# Patient Record
Sex: FEMALE | Race: WHITE | NOT HISPANIC OR LATINO
[De-identification: names, ages, dates, MRNs, and addresses within clinical notes are randomized per-mention and may not be internally consistent; named-entity substitution may affect disease eponyms.]

---

## 2017-02-08 ENCOUNTER — APPOINTMENT (OUTPATIENT)
Dept: ULTRASOUND IMAGING | Facility: HOSPITAL | Age: 82
End: 2017-02-08

## 2017-02-08 ENCOUNTER — OUTPATIENT (OUTPATIENT)
Dept: OUTPATIENT SERVICES | Facility: HOSPITAL | Age: 82
LOS: 1 days | End: 2017-02-08
Payer: MEDICARE

## 2017-02-08 DIAGNOSIS — M79.605 PAIN IN LEFT LEG: ICD-10-CM

## 2017-02-08 PROCEDURE — 93971 EXTREMITY STUDY: CPT | Mod: 26,LT

## 2018-04-04 ENCOUNTER — INPATIENT (INPATIENT)
Facility: HOSPITAL | Age: 83
LOS: 5 days | Discharge: NURSING, REHAB OR LONG-TERM CARE | DRG: 392 | End: 2018-04-10
Attending: INTERNAL MEDICINE | Admitting: INTERNAL MEDICINE
Payer: MEDICARE

## 2018-04-04 VITALS
RESPIRATION RATE: 20 BRPM | OXYGEN SATURATION: 95 % | WEIGHT: 123.02 LBS | HEIGHT: 59 IN | HEART RATE: 74 BPM | SYSTOLIC BLOOD PRESSURE: 158 MMHG | DIASTOLIC BLOOD PRESSURE: 85 MMHG | TEMPERATURE: 97 F

## 2018-04-04 DIAGNOSIS — K52.9 NONINFECTIVE GASTROENTERITIS AND COLITIS, UNSPECIFIED: ICD-10-CM

## 2018-04-04 LAB
ALBUMIN SERPL ELPH-MCNC: 3.9 G/DL — SIGNIFICANT CHANGE UP (ref 3.5–5)
ALP SERPL-CCNC: 132 U/L — HIGH (ref 40–120)
ALT FLD-CCNC: 22 U/L DA — SIGNIFICANT CHANGE UP (ref 10–60)
ANION GAP SERPL CALC-SCNC: 8 MMOL/L — SIGNIFICANT CHANGE UP (ref 5–17)
APTT BLD: 39.3 SEC — HIGH (ref 27.5–37.4)
AST SERPL-CCNC: 32 U/L — SIGNIFICANT CHANGE UP (ref 10–40)
BASOPHILS # BLD AUTO: 0.1 K/UL — SIGNIFICANT CHANGE UP (ref 0–0.2)
BASOPHILS NFR BLD AUTO: 0.8 % — SIGNIFICANT CHANGE UP (ref 0–2)
BILIRUB SERPL-MCNC: 0.7 MG/DL — SIGNIFICANT CHANGE UP (ref 0.2–1.2)
BUN SERPL-MCNC: 56 MG/DL — HIGH (ref 7–18)
CALCIUM SERPL-MCNC: 10.2 MG/DL — SIGNIFICANT CHANGE UP (ref 8.4–10.5)
CHLORIDE SERPL-SCNC: 98 MMOL/L — SIGNIFICANT CHANGE UP (ref 96–108)
CK MB CFR SERPL CALC: 3.4 NG/ML — SIGNIFICANT CHANGE UP (ref 0–3.6)
CO2 SERPL-SCNC: 26 MMOL/L — SIGNIFICANT CHANGE UP (ref 22–31)
CREAT SERPL-MCNC: 1.9 MG/DL — HIGH (ref 0.5–1.3)
EOSINOPHIL # BLD AUTO: 0.2 K/UL — SIGNIFICANT CHANGE UP (ref 0–0.5)
EOSINOPHIL NFR BLD AUTO: 1.6 % — SIGNIFICANT CHANGE UP (ref 0–6)
GLUCOSE SERPL-MCNC: 120 MG/DL — HIGH (ref 70–99)
HCT VFR BLD CALC: 46.8 % — HIGH (ref 34.5–45)
HGB BLD-MCNC: 14.9 G/DL — SIGNIFICANT CHANGE UP (ref 11.5–15.5)
INR BLD: 0.97 RATIO — SIGNIFICANT CHANGE UP (ref 0.88–1.16)
LIDOCAIN IGE QN: 269 U/L — SIGNIFICANT CHANGE UP (ref 73–393)
LYMPHOCYTES # BLD AUTO: 18.7 % — SIGNIFICANT CHANGE UP (ref 13–44)
LYMPHOCYTES # BLD AUTO: 2.1 K/UL — SIGNIFICANT CHANGE UP (ref 1–3.3)
MCHC RBC-ENTMCNC: 29.5 PG — SIGNIFICANT CHANGE UP (ref 27–34)
MCHC RBC-ENTMCNC: 31.9 GM/DL — LOW (ref 32–36)
MCV RBC AUTO: 92.6 FL — SIGNIFICANT CHANGE UP (ref 80–100)
MONOCYTES # BLD AUTO: 0.8 K/UL — SIGNIFICANT CHANGE UP (ref 0–0.9)
MONOCYTES NFR BLD AUTO: 7.1 % — SIGNIFICANT CHANGE UP (ref 2–14)
NEUTROPHILS # BLD AUTO: 7.9 K/UL — HIGH (ref 1.8–7.4)
NEUTROPHILS NFR BLD AUTO: 71.8 % — SIGNIFICANT CHANGE UP (ref 43–77)
OB PNL STL: POSITIVE
PLATELET # BLD AUTO: 223 K/UL — SIGNIFICANT CHANGE UP (ref 150–400)
POTASSIUM SERPL-MCNC: 4.6 MMOL/L — SIGNIFICANT CHANGE UP (ref 3.5–5.3)
POTASSIUM SERPL-SCNC: 4.6 MMOL/L — SIGNIFICANT CHANGE UP (ref 3.5–5.3)
PROT SERPL-MCNC: 8.1 G/DL — SIGNIFICANT CHANGE UP (ref 6–8.3)
PROTHROM AB SERPL-ACNC: 10.6 SEC — SIGNIFICANT CHANGE UP (ref 9.8–12.7)
RBC # BLD: 5.06 M/UL — SIGNIFICANT CHANGE UP (ref 3.8–5.2)
RBC # FLD: 12.5 % — SIGNIFICANT CHANGE UP (ref 10.3–14.5)
SODIUM SERPL-SCNC: 132 MMOL/L — LOW (ref 135–145)
TROPONIN I SERPL-MCNC: 0.04 NG/ML — SIGNIFICANT CHANGE UP (ref 0–0.04)
WBC # BLD: 10.9 K/UL — HIGH (ref 3.8–10.5)
WBC # FLD AUTO: 10.9 K/UL — HIGH (ref 3.8–10.5)

## 2018-04-04 PROCEDURE — 71045 X-RAY EXAM CHEST 1 VIEW: CPT | Mod: 26

## 2018-04-04 PROCEDURE — 99285 EMERGENCY DEPT VISIT HI MDM: CPT | Mod: 25

## 2018-04-04 PROCEDURE — 74176 CT ABD & PELVIS W/O CONTRAST: CPT | Mod: 26

## 2018-04-04 RX ORDER — METRONIDAZOLE 500 MG
500 TABLET ORAL ONCE
Qty: 0 | Refills: 0 | Status: COMPLETED | OUTPATIENT
Start: 2018-04-04 | End: 2018-04-04

## 2018-04-04 RX ORDER — SODIUM CHLORIDE 9 MG/ML
1000 INJECTION, SOLUTION INTRAVENOUS
Qty: 0 | Refills: 0 | Status: DISCONTINUED | OUTPATIENT
Start: 2018-04-04 | End: 2018-04-05

## 2018-04-04 RX ORDER — CIPROFLOXACIN LACTATE 400MG/40ML
400 VIAL (ML) INTRAVENOUS ONCE
Qty: 0 | Refills: 0 | Status: COMPLETED | OUTPATIENT
Start: 2018-04-04 | End: 2018-04-04

## 2018-04-04 RX ORDER — ONDANSETRON 8 MG/1
4 TABLET, FILM COATED ORAL ONCE
Qty: 0 | Refills: 0 | Status: COMPLETED | OUTPATIENT
Start: 2018-04-04 | End: 2018-04-04

## 2018-04-04 RX ADMIN — SODIUM CHLORIDE 70 MILLILITER(S): 9 INJECTION, SOLUTION INTRAVENOUS at 17:16

## 2018-04-04 RX ADMIN — Medication 200 MILLIGRAM(S): at 21:42

## 2018-04-04 RX ADMIN — ONDANSETRON 4 MILLIGRAM(S): 8 TABLET, FILM COATED ORAL at 17:16

## 2018-04-04 RX ADMIN — Medication 100 MILLIGRAM(S): at 21:42

## 2018-04-04 NOTE — ED PROVIDER NOTE - PMH
CHF    Chronic constipation    COPD    History of gastroesophageal reflux (GERD)    hypertension    ventral hernia

## 2018-04-04 NOTE — ED ADULT NURSE NOTE - OBJECTIVE STATEMENT
patient denies any difficulty breathing or SOB at this time, states having abdominal pain, had 2 episode of diarrhea, MD Alberto made aware.

## 2018-04-04 NOTE — ED PROVIDER NOTE - OBJECTIVE STATEMENT
89F presenting with shortness of breath, upper abdominal pain and acute on chronic right lower leg pain. Patient is a very challenging historian and very hard of hearing but denies associated chest pain, fevers or chills.        [facility] paperwork states: "PRN Ibuprofen given @ 2:45pm 200mg" SOB c/w R leg pain unable to ambulate.        PMD Tenzin Whitney

## 2018-04-04 NOTE — ED PROVIDER NOTE - MEDICAL DECISION MAKING DETAILS
Attending MD Carvalho: 89F with COPD, CHF, CAD, PPM presenting from nursing facility with upper abd pain, nausea/vomiting and dyspnea, resolved now. Also with acute on chronic right leg pain, no trauma. Exam with fairly benign abdomen but will obtain CT a/p to r/o acute intraabd process, ddx includes ACS given CAD history. RLE exam unrevealing, warm and well perfused, will obtain XR, DVT US as well. Admission for r/o ACS provided CT a/p unrevealing.

## 2018-04-04 NOTE — ED PROVIDER NOTE - PHYSICAL EXAMINATION
Attending MD Cavralho: A & O x 3, NAD, EOMI b/l, PERRL b/l; lungs CTAB, heart with reg rhythm without murmur; abdomen soft NTND; extremities with b/l 3+ nonpitting edema b/l lower legs, affect appropriate. neuro exam non focal with no motor or sensory deficits.

## 2018-04-05 DIAGNOSIS — K52.9 NONINFECTIVE GASTROENTERITIS AND COLITIS, UNSPECIFIED: ICD-10-CM

## 2018-04-05 DIAGNOSIS — N17.9 ACUTE KIDNEY FAILURE, UNSPECIFIED: ICD-10-CM

## 2018-04-05 DIAGNOSIS — I50.9 HEART FAILURE, UNSPECIFIED: ICD-10-CM

## 2018-04-05 DIAGNOSIS — Z29.9 ENCOUNTER FOR PROPHYLACTIC MEASURES, UNSPECIFIED: ICD-10-CM

## 2018-04-05 DIAGNOSIS — I10 ESSENTIAL (PRIMARY) HYPERTENSION: ICD-10-CM

## 2018-04-05 LAB
ANION GAP SERPL CALC-SCNC: 9 MMOL/L — SIGNIFICANT CHANGE UP (ref 5–17)
BASOPHILS # BLD AUTO: 0.1 K/UL — SIGNIFICANT CHANGE UP (ref 0–0.2)
BASOPHILS NFR BLD AUTO: 1.1 % — SIGNIFICANT CHANGE UP (ref 0–2)
BUN SERPL-MCNC: 52 MG/DL — HIGH (ref 7–18)
C DIFF BY PCR RESULT: SIGNIFICANT CHANGE UP
C DIFF TOX GENS STL QL NAA+PROBE: SIGNIFICANT CHANGE UP
CALCIUM SERPL-MCNC: 9.8 MG/DL — SIGNIFICANT CHANGE UP (ref 8.4–10.5)
CHLORIDE SERPL-SCNC: 101 MMOL/L — SIGNIFICANT CHANGE UP (ref 96–108)
CK MB BLD-MCNC: 3.8 % — HIGH (ref 0–3.5)
CK MB CFR SERPL CALC: 2.3 NG/ML — SIGNIFICANT CHANGE UP (ref 0–3.6)
CK SERPL-CCNC: 60 U/L — SIGNIFICANT CHANGE UP (ref 21–215)
CO2 SERPL-SCNC: 24 MMOL/L — SIGNIFICANT CHANGE UP (ref 22–31)
CREAT SERPL-MCNC: 1.64 MG/DL — HIGH (ref 0.5–1.3)
EOSINOPHIL # BLD AUTO: 0 K/UL — SIGNIFICANT CHANGE UP (ref 0–0.5)
EOSINOPHIL NFR BLD AUTO: 0.5 % — SIGNIFICANT CHANGE UP (ref 0–6)
GLUCOSE SERPL-MCNC: 135 MG/DL — HIGH (ref 70–99)
HCT VFR BLD CALC: 38.6 % — SIGNIFICANT CHANGE UP (ref 34.5–45)
HCT VFR BLD CALC: 44.2 % — SIGNIFICANT CHANGE UP (ref 34.5–45)
HGB BLD-MCNC: 12.4 G/DL — SIGNIFICANT CHANGE UP (ref 11.5–15.5)
HGB BLD-MCNC: 14.1 G/DL — SIGNIFICANT CHANGE UP (ref 11.5–15.5)
LYMPHOCYTES # BLD AUTO: 0.8 K/UL — LOW (ref 1–3.3)
LYMPHOCYTES # BLD AUTO: 13 % — SIGNIFICANT CHANGE UP (ref 13–44)
MCHC RBC-ENTMCNC: 29.5 PG — SIGNIFICANT CHANGE UP (ref 27–34)
MCHC RBC-ENTMCNC: 29.5 PG — SIGNIFICANT CHANGE UP (ref 27–34)
MCHC RBC-ENTMCNC: 31.9 GM/DL — LOW (ref 32–36)
MCHC RBC-ENTMCNC: 32 GM/DL — SIGNIFICANT CHANGE UP (ref 32–36)
MCV RBC AUTO: 92.3 FL — SIGNIFICANT CHANGE UP (ref 80–100)
MCV RBC AUTO: 92.6 FL — SIGNIFICANT CHANGE UP (ref 80–100)
MONOCYTES # BLD AUTO: 0.4 K/UL — SIGNIFICANT CHANGE UP (ref 0–0.9)
MONOCYTES NFR BLD AUTO: 6.3 % — SIGNIFICANT CHANGE UP (ref 2–14)
NEUTROPHILS # BLD AUTO: 4.9 K/UL — SIGNIFICANT CHANGE UP (ref 1.8–7.4)
NEUTROPHILS NFR BLD AUTO: 79.1 % — HIGH (ref 43–77)
NT-PROBNP SERPL-SCNC: 1201 PG/ML — HIGH (ref 0–450)
PLATELET # BLD AUTO: 130 K/UL — LOW (ref 150–400)
PLATELET # BLD AUTO: 188 K/UL — SIGNIFICANT CHANGE UP (ref 150–400)
POTASSIUM SERPL-MCNC: 3.9 MMOL/L — SIGNIFICANT CHANGE UP (ref 3.5–5.3)
POTASSIUM SERPL-SCNC: 3.9 MMOL/L — SIGNIFICANT CHANGE UP (ref 3.5–5.3)
PROCALCITONIN SERPL-MCNC: 0.28 NG/ML — HIGH (ref 0–0.04)
RBC # BLD: 4.19 M/UL — SIGNIFICANT CHANGE UP (ref 3.8–5.2)
RBC # BLD: 4.78 M/UL — SIGNIFICANT CHANGE UP (ref 3.8–5.2)
RBC # FLD: 11.9 % — SIGNIFICANT CHANGE UP (ref 10.3–14.5)
RBC # FLD: 12.2 % — SIGNIFICANT CHANGE UP (ref 10.3–14.5)
SODIUM SERPL-SCNC: 134 MMOL/L — LOW (ref 135–145)
TROPONIN I SERPL-MCNC: 0.03 NG/ML — SIGNIFICANT CHANGE UP (ref 0–0.04)
WBC # BLD: 6.2 K/UL — SIGNIFICANT CHANGE UP (ref 3.8–10.5)
WBC # BLD: 7.5 K/UL — SIGNIFICANT CHANGE UP (ref 3.8–10.5)
WBC # FLD AUTO: 6.2 K/UL — SIGNIFICANT CHANGE UP (ref 3.8–10.5)
WBC # FLD AUTO: 7.5 K/UL — SIGNIFICANT CHANGE UP (ref 3.8–10.5)

## 2018-04-05 PROCEDURE — 93970 EXTREMITY STUDY: CPT | Mod: 26

## 2018-04-05 PROCEDURE — 73590 X-RAY EXAM OF LOWER LEG: CPT | Mod: 26,RT

## 2018-04-05 PROCEDURE — 73610 X-RAY EXAM OF ANKLE: CPT | Mod: 26,RT

## 2018-04-05 RX ORDER — CIPROFLOXACIN LACTATE 400MG/40ML
200 VIAL (ML) INTRAVENOUS EVERY 12 HOURS
Qty: 0 | Refills: 0 | Status: DISCONTINUED | OUTPATIENT
Start: 2018-04-05 | End: 2018-04-09

## 2018-04-05 RX ORDER — SPIRONOLACTONE 25 MG/1
25 TABLET, FILM COATED ORAL DAILY
Qty: 0 | Refills: 0 | Status: DISCONTINUED | OUTPATIENT
Start: 2018-04-05 | End: 2018-04-10

## 2018-04-05 RX ORDER — METRONIDAZOLE 500 MG
500 TABLET ORAL EVERY 8 HOURS
Qty: 0 | Refills: 0 | Status: DISCONTINUED | OUTPATIENT
Start: 2018-04-05 | End: 2018-04-09

## 2018-04-05 RX ORDER — SODIUM CHLORIDE 9 MG/ML
1000 INJECTION, SOLUTION INTRAVENOUS
Qty: 0 | Refills: 0 | Status: DISCONTINUED | OUTPATIENT
Start: 2018-04-05 | End: 2018-04-10

## 2018-04-05 RX ORDER — PANTOPRAZOLE SODIUM 20 MG/1
40 TABLET, DELAYED RELEASE ORAL
Qty: 0 | Refills: 0 | Status: DISCONTINUED | OUTPATIENT
Start: 2018-04-05 | End: 2018-04-10

## 2018-04-05 RX ORDER — ATORVASTATIN CALCIUM 80 MG/1
10 TABLET, FILM COATED ORAL AT BEDTIME
Qty: 0 | Refills: 0 | Status: DISCONTINUED | OUTPATIENT
Start: 2018-04-05 | End: 2018-04-10

## 2018-04-05 RX ORDER — ZINC GLUCONATE 30 MG
50 TABLET ORAL DAILY
Qty: 0 | Refills: 0 | Status: DISCONTINUED | OUTPATIENT
Start: 2018-04-05 | End: 2018-04-05

## 2018-04-05 RX ORDER — ZINC SULFATE TAB 220 MG (50 MG ZINC EQUIVALENT) 220 (50 ZN) MG
220 TAB ORAL DAILY
Qty: 0 | Refills: 0 | Status: DISCONTINUED | OUTPATIENT
Start: 2018-04-05 | End: 2018-04-10

## 2018-04-05 RX ORDER — LORATADINE 10 MG/1
10 TABLET ORAL DAILY
Qty: 0 | Refills: 0 | Status: DISCONTINUED | OUTPATIENT
Start: 2018-04-05 | End: 2018-04-10

## 2018-04-05 RX ORDER — CLOPIDOGREL BISULFATE 75 MG/1
75 TABLET, FILM COATED ORAL DAILY
Qty: 0 | Refills: 0 | Status: DISCONTINUED | OUTPATIENT
Start: 2018-04-05 | End: 2018-04-10

## 2018-04-05 RX ORDER — FUROSEMIDE 40 MG
80 TABLET ORAL
Qty: 0 | Refills: 0 | Status: DISCONTINUED | OUTPATIENT
Start: 2018-04-05 | End: 2018-04-05

## 2018-04-05 RX ORDER — METOPROLOL TARTRATE 50 MG
50 TABLET ORAL
Qty: 0 | Refills: 0 | Status: DISCONTINUED | OUTPATIENT
Start: 2018-04-05 | End: 2018-04-10

## 2018-04-05 RX ORDER — HEPARIN SODIUM 5000 [USP'U]/ML
5000 INJECTION INTRAVENOUS; SUBCUTANEOUS EVERY 8 HOURS
Qty: 0 | Refills: 0 | Status: DISCONTINUED | OUTPATIENT
Start: 2018-04-05 | End: 2018-04-10

## 2018-04-05 RX ORDER — LANOLIN ALCOHOL/MO/W.PET/CERES
3 CREAM (GRAM) TOPICAL AT BEDTIME
Qty: 0 | Refills: 0 | Status: DISCONTINUED | OUTPATIENT
Start: 2018-04-05 | End: 2018-04-10

## 2018-04-05 RX ORDER — MIRTAZAPINE 45 MG/1
15 TABLET, ORALLY DISINTEGRATING ORAL AT BEDTIME
Qty: 0 | Refills: 0 | Status: DISCONTINUED | OUTPATIENT
Start: 2018-04-05 | End: 2018-04-10

## 2018-04-05 RX ADMIN — PANTOPRAZOLE SODIUM 40 MILLIGRAM(S): 20 TABLET, DELAYED RELEASE ORAL at 09:01

## 2018-04-05 RX ADMIN — LORATADINE 10 MILLIGRAM(S): 10 TABLET ORAL at 12:23

## 2018-04-05 RX ADMIN — Medication 1 TABLET(S): at 15:24

## 2018-04-05 RX ADMIN — SPIRONOLACTONE 25 MILLIGRAM(S): 25 TABLET, FILM COATED ORAL at 06:22

## 2018-04-05 RX ADMIN — HEPARIN SODIUM 5000 UNIT(S): 5000 INJECTION INTRAVENOUS; SUBCUTANEOUS at 22:50

## 2018-04-05 RX ADMIN — Medication 100 MILLIGRAM(S): at 06:00

## 2018-04-05 RX ADMIN — Medication 100 MILLIGRAM(S): at 23:13

## 2018-04-05 RX ADMIN — Medication 100 MILLIGRAM(S): at 17:54

## 2018-04-05 RX ADMIN — CLOPIDOGREL BISULFATE 75 MILLIGRAM(S): 75 TABLET, FILM COATED ORAL at 12:19

## 2018-04-05 RX ADMIN — Medication 50 MILLIGRAM(S): at 06:01

## 2018-04-05 RX ADMIN — ZINC SULFATE TAB 220 MG (50 MG ZINC EQUIVALENT) 220 MILLIGRAM(S): 220 (50 ZN) TAB at 15:24

## 2018-04-05 RX ADMIN — HEPARIN SODIUM 5000 UNIT(S): 5000 INJECTION INTRAVENOUS; SUBCUTANEOUS at 06:01

## 2018-04-05 RX ADMIN — Medication 100 MILLIGRAM(S): at 15:39

## 2018-04-05 RX ADMIN — HEPARIN SODIUM 5000 UNIT(S): 5000 INJECTION INTRAVENOUS; SUBCUTANEOUS at 15:24

## 2018-04-05 RX ADMIN — Medication 50 MILLIGRAM(S): at 17:54

## 2018-04-05 RX ADMIN — SODIUM CHLORIDE 75 MILLILITER(S): 9 INJECTION, SOLUTION INTRAVENOUS at 06:57

## 2018-04-05 NOTE — H&P ADULT - PROBLEM SELECTOR PLAN 3
BUN/Cr>20  Baseline creatinine unknown   Likely 2/2 to poor PO intake  - Will give IVF@70ml/hr  - Continue to monitor

## 2018-04-05 NOTE — H&P ADULT - HISTORY OF PRESENT ILLNESS
89 y/o female with PMHx of COPD, CHF, chronic ventral hernias and depression was sent in from NH  with c/o "R leg pain, unable to ambulate". Patient is oriented to her name only, she is extremely hard of hearing and very poor historian. She c/o persistent diarrhea. She said at NH they gave her enema after which is having multiple loose BMs. She denies abdominal pain, nausea or vomiting. Denies any other complaints except for persistently getting wet due to loose stools. In the ED, patient had multiple BMs. CT was done which shows colitis. She also has b/l LE swelling with tenderness. She denies chest pain or SOB.

## 2018-04-05 NOTE — H&P ADULT - PROBLEM SELECTOR PLAN 1
Multiple episodes of loose BM in ED  Per patient it is 2/2 to enema given at NH today  WBC- 10.9  CT- colitis, correlate clinically  FOBT- positive, Hemoglobin stable at 14.9- Likely 2/2 to colitis  - c/w Cipro and Flagyl  - f/u blood cx  - f/u procalcitonin, if negative consider discontinuing abx as the diarrhea could de 2/2 to enema

## 2018-04-05 NOTE — H&P ADULT - PROBLEM SELECTOR PLAN 6
[] Previous VTE                                                3  [] Thrombophilia                                             2  [] Lower limb paralysis                                   2    [] Current Cancer                                             2   [X] Immobilization > 24 hrs                              1  [] ICU/CCU stay > 24 hours                             1  [X] Age > 60                                                         1    IMPROVE VTE Score: 2  Heparin

## 2018-04-05 NOTE — H&P ADULT - NSHPREVIEWOFSYSTEMS_GEN_ALL_CORE
REVIEW OF SYSTEMS: LIMITED DUE TO CLINICAL CONDITION    NECK: No pain or stiffness  RESPIRATORY: No cough; No shortness of breath  CARDIOVASCULAR: b/l leg swelling. No chest pain  GASTROINTESTINAL: diarrhea. No abdominal. No nausea, or vomiting  GENITOURINARY: No dysuria, frequency, hematuria, or incontinence  MUSCULOSKELETAL: No joint pain

## 2018-04-05 NOTE — H&P ADULT - ASSESSMENT
89 y/o female with PMHx of COPD, CHF, chronic ventral hernias and depression was sent in from NH  with c/o "R leg pain, unable to ambulate". She is admitted for colitis and to rule out DVT.

## 2018-04-05 NOTE — H&P ADULT - PROBLEM SELECTOR PLAN 4
hx of CHF per chart?  not in exacerbation at this time  - f/u BNP  - f/u ECHO  - c/w sprinolactone, and metoprolol  - c/w plavix  - on lasix 80mg bid- will hold off to it due to KOKO

## 2018-04-05 NOTE — H&P ADULT - NSHPPHYSICALEXAM_GEN_ALL_CORE
Vital Signs Last 24 Hrs  T(C): 36.3 (04 Apr 2018 15:40), Max: 36.3 (04 Apr 2018 15:40)  T(F): 97.3 (04 Apr 2018 15:40), Max: 97.3 (04 Apr 2018 15:40)  HR: 74 (04 Apr 2018 15:40) (74 - 74)  BP: 158/85 (04 Apr 2018 15:40) (158/85 - 158/85)  BP(mean): --  RR: 20 (04 Apr 2018 15:40) (20 - 20)  SpO2: 95% (04 Apr 2018 15:40) (95% - 95%)    GENERAL: NAD  HEAD:  Atraumatic, Normocephalic  EYES: EOMI, PERRLA, conjunctiva and sclera clear  ENMT: Moist mucous membranes  NECK: Supple  NERVOUS SYSTEM:  Alert & Oriented X1  CHEST/LUNG: Clear to auscultation bilaterally; No rales, rhonchi, wheezing, or rubs  HEART: Regular rate and rhythm; No murmurs, rubs, or gallops  ABDOMEN: Soft, Nontender, Nondistended; Bowel sounds present  EXTREMITIES:  2+ Peripheral Pulses, LE swelling and +1 edema

## 2018-04-06 LAB
ANION GAP SERPL CALC-SCNC: 6 MMOL/L — SIGNIFICANT CHANGE UP (ref 5–17)
BUN SERPL-MCNC: 32 MG/DL — HIGH (ref 7–18)
CALCIUM SERPL-MCNC: 9.2 MG/DL — SIGNIFICANT CHANGE UP (ref 8.4–10.5)
CHLORIDE SERPL-SCNC: 107 MMOL/L — SIGNIFICANT CHANGE UP (ref 96–108)
CO2 SERPL-SCNC: 26 MMOL/L — SIGNIFICANT CHANGE UP (ref 22–31)
CREAT SERPL-MCNC: 1.14 MG/DL — SIGNIFICANT CHANGE UP (ref 0.5–1.3)
GLUCOSE SERPL-MCNC: 92 MG/DL — SIGNIFICANT CHANGE UP (ref 70–99)
HCT VFR BLD CALC: 35.3 % — SIGNIFICANT CHANGE UP (ref 34.5–45)
HGB BLD-MCNC: 11.3 G/DL — LOW (ref 11.5–15.5)
INR BLD: 1.1 RATIO — SIGNIFICANT CHANGE UP (ref 0.88–1.16)
MAGNESIUM SERPL-MCNC: 2.3 MG/DL — SIGNIFICANT CHANGE UP (ref 1.6–2.6)
MCHC RBC-ENTMCNC: 29.7 PG — SIGNIFICANT CHANGE UP (ref 27–34)
MCHC RBC-ENTMCNC: 32.1 GM/DL — SIGNIFICANT CHANGE UP (ref 32–36)
MCV RBC AUTO: 92.7 FL — SIGNIFICANT CHANGE UP (ref 80–100)
PHOSPHATE SERPL-MCNC: 2 MG/DL — LOW (ref 2.5–4.5)
PLATELET # BLD AUTO: 169 K/UL — SIGNIFICANT CHANGE UP (ref 150–400)
POTASSIUM SERPL-MCNC: 3.6 MMOL/L — SIGNIFICANT CHANGE UP (ref 3.5–5.3)
POTASSIUM SERPL-SCNC: 3.6 MMOL/L — SIGNIFICANT CHANGE UP (ref 3.5–5.3)
PROTHROM AB SERPL-ACNC: 12 SEC — SIGNIFICANT CHANGE UP (ref 9.8–12.7)
RBC # BLD: 3.8 M/UL — SIGNIFICANT CHANGE UP (ref 3.8–5.2)
RBC # FLD: 12.3 % — SIGNIFICANT CHANGE UP (ref 10.3–14.5)
SODIUM SERPL-SCNC: 139 MMOL/L — SIGNIFICANT CHANGE UP (ref 135–145)
WBC # BLD: 6.6 K/UL — SIGNIFICANT CHANGE UP (ref 3.8–10.5)
WBC # FLD AUTO: 6.6 K/UL — SIGNIFICANT CHANGE UP (ref 3.8–10.5)

## 2018-04-06 RX ADMIN — CLOPIDOGREL BISULFATE 75 MILLIGRAM(S): 75 TABLET, FILM COATED ORAL at 11:45

## 2018-04-06 RX ADMIN — Medication 50 MILLIGRAM(S): at 17:53

## 2018-04-06 RX ADMIN — Medication 3 MILLIGRAM(S): at 22:39

## 2018-04-06 RX ADMIN — SPIRONOLACTONE 25 MILLIGRAM(S): 25 TABLET, FILM COATED ORAL at 06:33

## 2018-04-06 RX ADMIN — HEPARIN SODIUM 5000 UNIT(S): 5000 INJECTION INTRAVENOUS; SUBCUTANEOUS at 14:19

## 2018-04-06 RX ADMIN — LORATADINE 10 MILLIGRAM(S): 10 TABLET ORAL at 14:18

## 2018-04-06 RX ADMIN — Medication 100 MILLIGRAM(S): at 17:53

## 2018-04-06 RX ADMIN — HEPARIN SODIUM 5000 UNIT(S): 5000 INJECTION INTRAVENOUS; SUBCUTANEOUS at 22:39

## 2018-04-06 RX ADMIN — Medication 1 TABLET(S): at 14:18

## 2018-04-06 RX ADMIN — Medication 100 MILLIGRAM(S): at 06:33

## 2018-04-06 RX ADMIN — ATORVASTATIN CALCIUM 10 MILLIGRAM(S): 80 TABLET, FILM COATED ORAL at 22:39

## 2018-04-06 RX ADMIN — Medication 100 MILLIGRAM(S): at 14:19

## 2018-04-06 RX ADMIN — HEPARIN SODIUM 5000 UNIT(S): 5000 INJECTION INTRAVENOUS; SUBCUTANEOUS at 06:33

## 2018-04-06 RX ADMIN — ZINC SULFATE TAB 220 MG (50 MG ZINC EQUIVALENT) 220 MILLIGRAM(S): 220 (50 ZN) TAB at 14:18

## 2018-04-06 RX ADMIN — Medication 100 MILLIGRAM(S): at 22:39

## 2018-04-06 RX ADMIN — MIRTAZAPINE 15 MILLIGRAM(S): 45 TABLET, ORALLY DISINTEGRATING ORAL at 22:39

## 2018-04-06 RX ADMIN — Medication 50 MILLIGRAM(S): at 06:33

## 2018-04-06 RX ADMIN — PANTOPRAZOLE SODIUM 40 MILLIGRAM(S): 20 TABLET, DELAYED RELEASE ORAL at 09:44

## 2018-04-06 NOTE — PROGRESS NOTE ADULT - SUBJECTIVE AND OBJECTIVE BOX
NP Note discussed with  Primary Attending    89 y/o female with PMHx of COPD, CHF, chronic ventral hernias and depression was sent in from NH  with c/o R leg pain, swelling and persistent diarrhea. CT abd/pelvis + colitis. Pt seen, sitting up in bed, eating breakfast, appears comfortable, NAD.  Reports feeling better, no diarrhea today. Denies abd pain, chest pain, fever, SOB. BC negative, cdiff negative, doppler negative for DVT, normal TTE          INTERVAL HPI/OVERNIGHT EVENTS: no new complaints    MEDICATIONS  (STANDING):  atorvastatin 10 milliGRAM(s) Oral at bedtime  calcium carbonate 1250 mG + Vitamin D (OsCal 500 + D) 1 Tablet(s) Oral daily  ciprofloxacin   IVPB 200 milliGRAM(s) IV Intermittent every 12 hours  clopidogrel Tablet 75 milliGRAM(s) Oral daily  heparin  Injectable 5000 Unit(s) SubCutaneous every 8 hours  lactated ringers. 1000 milliLiter(s) (75 mL/Hr) IV Continuous <Continuous>  loratadine 10 milliGRAM(s) Oral daily  melatonin 3 milliGRAM(s) Oral at bedtime  metoprolol tartrate 50 milliGRAM(s) Oral two times a day  metroNIDAZOLE  IVPB 500 milliGRAM(s) IV Intermittent every 8 hours  mirtazapine 15 milliGRAM(s) Oral at bedtime  pantoprazole    Tablet 40 milliGRAM(s) Oral before breakfast  spironolactone 25 milliGRAM(s) Oral daily  zinc sulfate 220 milliGRAM(s) Oral daily    MEDICATIONS  (PRN):      __________________________________________________  REVIEW OF SYSTEMS:    CONSTITUTIONAL: No fever,   RESPIRATORY: No cough; No shortness of breath  CARDIOVASCULAR: No chest pain, no palpitations  GASTROINTESTINAL: No pain. No nausea or vomiting; No diarrhea since last night   NEUROLOGICAL: No headache or numbness, no tremors  MUSCULOSKELETAL: No joint pain, no muscle pain  GENITOURINARY: no dysuria, no frequency, no hesitancy  PSYCHIATRY: no depression , no anxiety  ALL OTHER  ROS negative        Vital Signs Last 24 Hrs  T(C): 36.6 (06 Apr 2018 11:29), Max: 37.2 (05 Apr 2018 15:57)  T(F): 97.9 (06 Apr 2018 11:29), Max: 99 (05 Apr 2018 15:57)  HR: 76 (06 Apr 2018 11:29) (76 - 96)  BP: 126/100 (06 Apr 2018 11:29) (88/43 - 126/100)  BP(mean): --  RR: 16 (06 Apr 2018 11:29) (16 - 18)  SpO2: 100% (06 Apr 2018 11:29) (97% - 100%)    ________________________________________________  PHYSICAL EXAM:  GENERAL: NAD  CHEST/LUNG: Clear to auscultation  HEART: S1 S2  regular   ABDOMEN: Soft, + mildly ttp to left mid quadrant.  Bowel sounds present  EXTREMITIES: no cyanosis; + felix leg +2 edema; no calf tenderness  SKIN: warm and dry; no rash  NERVOUS SYSTEM:  Awake and alert; Oriented  to place, person.  no new deficits    _________________________________________________  LABS:                        11.3   6.6   )-----------( 169      ( 06 Apr 2018 05:54 )             35.3     04-06    139  |  107  |  32<H>  ----------------------------<  92  3.6   |  26  |  1.14    Ca    9.2      06 Apr 2018 05:54  Phos  2.0     04-06  Mg     2.3     04-06    TPro  8.1  /  Alb  3.9  /  TBili  0.7  /  DBili  x   /  AST  32  /  ALT  22  /  AlkPhos  132<H>  04-04    PT/INR - ( 06 Apr 2018 05:54 )   PT: 12.0 sec;   INR: 1.10 ratio         PTT - ( 04 Apr 2018 16:17 )  PTT:39.3 sec    CAPILLARY BLOOD GLUCOSE            RADIOLOGY & ADDITIONAL TESTS:    Imaging Personally Reviewed:  YES/NO    Consultant(s) Notes Reviewed:   YES/ No    Care Discussed with Consultants :     Plan of care was discussed with patient and /or primary care giver; all questions and concerns were addressed and care was aligned with patient's wishes.

## 2018-04-06 NOTE — ED ADULT NURSE REASSESSMENT NOTE - NS ED NURSE REASSESS COMMENT FT1
1500 pm pt with redness on perineal area with diarrhea number of the time , NP MADE AWARE - SPECIMEN SENT TO THE LAB ,.
type and screen hemolyzed, MD Bellpolfelipe informed, no new order given at this time.
Patient was endorsed by LILIYA Abel. Patient is hemodynamically stable and in no acute distress. Assisted to the bed pan and repositioned in bed for comfort. Patient verbalizes no complaints.

## 2018-04-06 NOTE — PROGRESS NOTE ADULT - SUBJECTIVE AND OBJECTIVE BOX
TAIWOKELLY JIMÉNEZ  MRN-[id number]    Patient is a 89y old  Female who presents with a chief complaint of R leg pain (05 Apr 2018 02:46)    patient seen and examined    s doing better ,     MEDICATIONS  (STANDING):  atorvastatin 10 milliGRAM(s) Oral at bedtime  calcium carbonate 1250 mG + Vitamin D (OsCal 500 + D) 1 Tablet(s) Oral daily  ciprofloxacin   IVPB 200 milliGRAM(s) IV Intermittent every 12 hours  clopidogrel Tablet 75 milliGRAM(s) Oral daily  heparin  Injectable 5000 Unit(s) SubCutaneous every 8 hours  lactated ringers. 1000 milliLiter(s) (75 mL/Hr) IV Continuous <Continuous>  loratadine 10 milliGRAM(s) Oral daily  melatonin 3 milliGRAM(s) Oral at bedtime  metoprolol tartrate 50 milliGRAM(s) Oral two times a day  metroNIDAZOLE  IVPB 500 milliGRAM(s) IV Intermittent every 8 hours  mirtazapine 15 milliGRAM(s) Oral at bedtime  pantoprazole    Tablet 40 milliGRAM(s) Oral before breakfast  spironolactone 25 milliGRAM(s) Oral daily  zinc sulfate 220 milliGRAM(s) Oral daily      MEDICATIONS  (PRN):      Allergies    Keppra (Unknown)  penicillin (Unknown)    Intolerances        PAST MEDICAL & SURGICAL HISTORY:  Chronic constipation  hypertension  ventral hernia  History of gastroesophageal reflux (GERD)  COPD  CHF  s/p exploratory laparotomy for SBO x 2: 2000 and in 2002  H/O: hysterectomy  Hx of appendectomy      FAMILY HISTORY:      SOCIAL HISTORY  Smoking History:     REVIEW OF SYSTEMS:    CONSTITUTIONAL:  Fevers [  ]  Yes  [ x ]  No                                   chills [  ]  Yes  [x  ]  No                               sweats  [  ]  Yes  [ x ]  No                                fatigue [ x ]  Yes  [  ]  No    HEENT:  Eyes:  Diplopia or blurred vision [  ]  Yes  [x  ]  No    ENT:                        earache  [  ]  Yes  [  x]  No                             sore throat  [  ]  Yes  [ x ]  No                            runny nose.  [  ]  Yes  [  x]  No    CARDIOVASCULAR:       chest pain  [  ]  Yes  [  ]  No                        squeezing, tightness,  [  ]  Yes  [  ]  No                                      palpitations.  [  ]  Yes  [  ]  No    RESPIRATORY:             Cough [  ]  Yes  [ x ]  No                                  Wheezing [  ]  Yes  x[  ]  No                                Hemoptysis [  ]  Yes  [ x ]  No                                      Sputum [  ]  Yes  [ x ]  No                   Shortness of Breathe [  ]  Yes  [ x ]  No    GASTROINTESTINAL:      Abdominal pain  [  ]  Yes  [ x]  No                                                    Nausea  [  ]  Yes  [x  ]  No                                                   Vomiting [  ]  Yes  [ x ]  No                                              Constipation [  ]  Yes  [ x ]  No                                                    Diarrhea [x  ]  Yes  [  ]  No, mild    GENITOURINARY:           Incontinence [ x ]  Yes  [  ]  No                                                Dysuria [  ]  Yes  [x  ]  No                                      Blood in Urine  [  ]  Yes  [xx  ]  No                          Frequency or urgency.  [  ]  Yes  [  x]  No    NEUROLOGIC:                     Paresthesias  [  ]  Yes  [ x ]  No                                             fasciculations  [  ]  Yes  [ x ]  No                                seizures or weakness.  [  ]  Yes  [  x]  No                                                   Headache [  ]  Yes  [ x ]  No                                  Loss of Conciousness [  ]  Yes  [  x]  No                                                     Insomnia [  ]  Yes  [  x]  No    PSYCHIATRIC:    Disorder of thought or mood.  [  x]  Yes  [  ]  No                                                           Anxiety [  ]  Yes  [ x ]  No                                                      Depression [  x]  Yes  [  ]  No                                                         Dementia [  x]  Yes  [   No    Vital Signs Last 24 Hrs  T(C): 36.4 (06 Apr 2018 07:54), Max: 37.2 (05 Apr 2018 11:03)  T(F): 97.6 (06 Apr 2018 07:54), Max: 99 (05 Apr 2018 15:57)  HR: 76 (06 Apr 2018 07:54) (76 - 96)  BP: 117/47 (06 Apr 2018 07:54) (88/43 - 122/77)  BP(mean): --  RR: 16 (06 Apr 2018 07:54) (16 - 18)  SpO2: 100% (06 Apr 2018 07:54) (95% - 100%)  I&O's Detail      PHYSICAL EXAMINATION:    GENERAL: The patient is  _____in no apparent distress.     HEENT: Head is normocephalic and atraumatic. Extraocular muscles are intact. Mucous membranes are moist.     NECK: Supple. jvd [  ]  Yes  [ x ]  No    LUNGS: Clear to auscultation  [ x ]  Yes  [  ]  No                               wheezing, [  ]  Yes  [ x ]  No                                      rales  [  ]  Yes  [ x ]  No                                    rhonchi  [  ]  Yes  [  x]  No                 Respirations labored  [  ]  Yes  x[  ]  No    HEART: Regular rate and rhythm  [  x]  Yes  [  ]  No                                        Murmur [  ]  Yes  [ x ]  No                                              rub  [  ]  Yes  [ x x  No                                          gallop  [  ]  Yes  [xx  ]  No    ABDOMEN:                                 Soft, [xx  ]  Yes  [  ]  No                                             nontender [x  ]  Yes  [  ]  No                                             distended.[  ]  Yes  [  x]  No                            hepatosplenomegaly ,[  ]  Yes  [ x ]  No                                                    BS   [x  ]  Yes  [  ]  No    EXTREMITIES:                cyanosis, [  ]  Yes  [ x ]  No                                       clubbing,   [  ]  Yes  [x  ]  No                                               rash, [  ]  Yes  [ x ]  No                                           edema.  [ x ]  Yes  [  ]  No    NEUROLOGIC: Alert and Oriented  [x  ] Person [  ] Time  [ ] Place                                    Cranial nerves intact    [  ]  Yes  [  ]  No                                               sensory Intact  [  ]  Yes  [  ]  No                                                  motor WNL   [  ]  Yes  [  ]  No                                                Tony Paresis  [  ]  Yes  [  ]  No  DTR  babinski+ or -      LABS:                        11.3   6.6   )-----------( 169      ( 06 Apr 2018 05:54 )             35.3     04-06    139  |  107  |  32<H>  ----------------------------<  92  3.6   |  26  |  1.14    Ca    9.2      06 Apr 2018 05:54  Phos  2.0     04-06  Mg     2.3     04-06    TPro  8.1  /  Alb  3.9  /  TBili  0.7  /  DBili  x   /  AST  32  /  ALT  22  /  AlkPhos  132<H>  04-04    PT/INR - ( 06 Apr 2018 05:54 )   PT: 12.0 sec;   INR: 1.10 ratio         PTT - ( 04 Apr 2018 16:17 )  PTT:39.3 sec      CARDIAC MARKERS ( 05 Apr 2018 06:18 )  0.033 ng/mL / x     / 60 U/L / x     / 2.3 ng/mL  CARDIAC MARKERS ( 04 Apr 2018 16:17 )  0.040 ng/mL / x     / x     / x     / 3.4 ng/mL        Serum Pro-Brain Natriuretic Peptide: 1201 pg/mL (04-05-18 @ 06:18)      Procalcitonin, Serum: 0.28 ng/mL (04-05-18 @ 12:37)      MICROBIOLOGY:    RADIOLOGY & ADDITIONAL STUDIES:< from: US Duplex Venous Lower Ext Complete, Bilateral (04.05.18 @ 12:11) >  Impression: Noevidence for deep venous thrombosis within the bilateral   common femoral venous, superficial femoral venous or popliteal venous   segments.        < end of copied text >  < from: Xray Ankle Complete 3 Views, Right (04.05.18 @ 07:56) >  IMPRESSION: Ankle swelling. Knee degeneration. Osteoporosis. No acute   bony finding.        < end of copied text >      CXR:    Ct scan chest:    ekg;    echo:

## 2018-04-07 LAB
ANION GAP SERPL CALC-SCNC: 6 MMOL/L — SIGNIFICANT CHANGE UP (ref 5–17)
BUN SERPL-MCNC: 23 MG/DL — HIGH (ref 7–18)
CALCIUM SERPL-MCNC: 9.1 MG/DL — SIGNIFICANT CHANGE UP (ref 8.4–10.5)
CHLORIDE SERPL-SCNC: 110 MMOL/L — HIGH (ref 96–108)
CO2 SERPL-SCNC: 25 MMOL/L — SIGNIFICANT CHANGE UP (ref 22–31)
CREAT SERPL-MCNC: 1.04 MG/DL — SIGNIFICANT CHANGE UP (ref 0.5–1.3)
GLUCOSE SERPL-MCNC: 98 MG/DL — SIGNIFICANT CHANGE UP (ref 70–99)
HCT VFR BLD CALC: 34.9 % — SIGNIFICANT CHANGE UP (ref 34.5–45)
HGB BLD-MCNC: 10.9 G/DL — LOW (ref 11.5–15.5)
MAGNESIUM SERPL-MCNC: 2.3 MG/DL — SIGNIFICANT CHANGE UP (ref 1.6–2.6)
MCHC RBC-ENTMCNC: 29.4 PG — SIGNIFICANT CHANGE UP (ref 27–34)
MCHC RBC-ENTMCNC: 31.2 GM/DL — LOW (ref 32–36)
MCV RBC AUTO: 94.3 FL — SIGNIFICANT CHANGE UP (ref 80–100)
PLATELET # BLD AUTO: 165 K/UL — SIGNIFICANT CHANGE UP (ref 150–400)
POTASSIUM SERPL-MCNC: 4 MMOL/L — SIGNIFICANT CHANGE UP (ref 3.5–5.3)
POTASSIUM SERPL-SCNC: 4 MMOL/L — SIGNIFICANT CHANGE UP (ref 3.5–5.3)
RBC # BLD: 3.7 M/UL — LOW (ref 3.8–5.2)
RBC # FLD: 12.6 % — SIGNIFICANT CHANGE UP (ref 10.3–14.5)
SODIUM SERPL-SCNC: 141 MMOL/L — SIGNIFICANT CHANGE UP (ref 135–145)
WBC # BLD: 6.6 K/UL — SIGNIFICANT CHANGE UP (ref 3.8–10.5)
WBC # FLD AUTO: 6.6 K/UL — SIGNIFICANT CHANGE UP (ref 3.8–10.5)

## 2018-04-07 RX ADMIN — SPIRONOLACTONE 25 MILLIGRAM(S): 25 TABLET, FILM COATED ORAL at 05:38

## 2018-04-07 RX ADMIN — Medication 100 MILLIGRAM(S): at 21:40

## 2018-04-07 RX ADMIN — ATORVASTATIN CALCIUM 10 MILLIGRAM(S): 80 TABLET, FILM COATED ORAL at 21:39

## 2018-04-07 RX ADMIN — CLOPIDOGREL BISULFATE 75 MILLIGRAM(S): 75 TABLET, FILM COATED ORAL at 11:27

## 2018-04-07 RX ADMIN — Medication 100 MILLIGRAM(S): at 13:26

## 2018-04-07 RX ADMIN — HEPARIN SODIUM 5000 UNIT(S): 5000 INJECTION INTRAVENOUS; SUBCUTANEOUS at 05:38

## 2018-04-07 RX ADMIN — ZINC SULFATE TAB 220 MG (50 MG ZINC EQUIVALENT) 220 MILLIGRAM(S): 220 (50 ZN) TAB at 11:28

## 2018-04-07 RX ADMIN — PANTOPRAZOLE SODIUM 40 MILLIGRAM(S): 20 TABLET, DELAYED RELEASE ORAL at 05:38

## 2018-04-07 RX ADMIN — Medication 1 TABLET(S): at 11:27

## 2018-04-07 RX ADMIN — HEPARIN SODIUM 5000 UNIT(S): 5000 INJECTION INTRAVENOUS; SUBCUTANEOUS at 13:26

## 2018-04-07 RX ADMIN — Medication 100 MILLIGRAM(S): at 17:10

## 2018-04-07 RX ADMIN — LORATADINE 10 MILLIGRAM(S): 10 TABLET ORAL at 11:27

## 2018-04-07 RX ADMIN — Medication 100 MILLIGRAM(S): at 05:38

## 2018-04-07 RX ADMIN — HEPARIN SODIUM 5000 UNIT(S): 5000 INJECTION INTRAVENOUS; SUBCUTANEOUS at 21:39

## 2018-04-07 RX ADMIN — Medication 50 MILLIGRAM(S): at 17:10

## 2018-04-07 RX ADMIN — Medication 3 MILLIGRAM(S): at 21:39

## 2018-04-07 RX ADMIN — MIRTAZAPINE 15 MILLIGRAM(S): 45 TABLET, ORALLY DISINTEGRATING ORAL at 21:39

## 2018-04-07 NOTE — PROGRESS NOTE ADULT - SUBJECTIVE AND OBJECTIVE BOX
TAIWOKELLY JIMÉNEZ  [id number]    Patient is a 89y old  Female who presents with a chief complaint of R leg pain (05 Apr 2018 02:46)    patient seen and examined    s doing better ,     .MEDICATIONS  (STANDING):  atorvastatin 10 milliGRAM(s) Oral at bedtime  calcium carbonate 1250 mG + Vitamin D (OsCal 500 + D) 1 Tablet(s) Oral daily  ciprofloxacin   IVPB 200 milliGRAM(s) IV Intermittent every 12 hours  clopidogrel Tablet 75 milliGRAM(s) Oral daily  heparin  Injectable 5000 Unit(s) SubCutaneous every 8 hours  lactated ringers. 1000 milliLiter(s) (75 mL/Hr) IV Continuous <Continuous>  loratadine 10 milliGRAM(s) Oral daily  melatonin 3 milliGRAM(s) Oral at bedtime  metoprolol tartrate 50 milliGRAM(s) Oral two times a day  metroNIDAZOLE  IVPB 500 milliGRAM(s) IV Intermittent every 8 hours  mirtazapine 15 milliGRAM(s) Oral at bedtime  pantoprazole    Tablet 40 milliGRAM(s) Oral before breakfast  spironolactone 25 milliGRAM(s) Oral daily  zinc sulfate 220 milliGRAM(s) Oral daily    MEDICATIONS  (PRN):        Allergies    Keppra (Unknown)  penicillin (Unknown)    Intolerances        PAST MEDICAL & SURGICAL HISTORY:  Chronic constipation  hypertension  ventral hernia  History of gastroesophageal reflux (GERD)  COPD  CHF  s/p exploratory laparotomy for SBO x 2: 2000 and in 2002  H/O: hysterectomy  Hx of appendectomy      FAMILY HISTORY:      SOCIAL HISTORY  Smoking History:     REVIEW OF SYSTEMS:    CONSTITUTIONAL:  Fevers [  ]  Yes  [ x ]  No                                   chills [  ]  Yes  [x  ]  No                               sweats  [  ]  Yes  [ x ]  No                                fatigue [ x ]  Yes  [  ]  No    HEENT:  Eyes:  Diplopia or blurred vision [  ]  Yes  [x  ]  No    ENT:                        earache  [  ]  Yes  [  x]  No                             sore throat  [  ]  Yes  [ x ]  No                            runny nose.  [  ]  Yes  [  x]  No    CARDIOVASCULAR:       chest pain  [  ]  Yes  [  ]  No                        squeezing, tightness,  [  ]  Yes  [  ]  No                                      palpitations.  [  ]  Yes  [  ]  No    RESPIRATORY:             Cough [  ]  Yes  [ x ]  No                                  Wheezing [  ]  Yes  x[  ]  No                                Hemoptysis [  ]  Yes  [ x ]  No                                      Sputum [  ]  Yes  [ x ]  No                   Shortness of Breathe [  ]  Yes  [ x ]  No    GASTROINTESTINAL:      Abdominal pain  [  ]  Yes  [ x]  No                                                    Nausea  [  ]  Yes  [x  ]  No                                                   Vomiting [  ]  Yes  [ x ]  No                                              Constipation [  ]  Yes  [ x ]  No                                                    Diarrhea [x  ]  Yes  [  ]  No, mild    GENITOURINARY:           Incontinence [ x ]  Yes  [  ]  No                                                Dysuria [  ]  Yes  [x  ]  No                                      Blood in Urine  [  ]  Yes  [xx  ]  No                          Frequency or urgency.  [  ]  Yes  [  x]  No    NEUROLOGIC:                     Paresthesias  [  ]  Yes  [ x ]  No                                             fasciculations  [  ]  Yes  [ x ]  No                                seizures or weakness.  [  ]  Yes  [  x]  No                                                   Headache [  ]  Yes  [ x ]  No                                  Loss of Conciousness [  ]  Yes  [  x]  No                                                     Insomnia [  ]  Yes  [  x]  No    PSYCHIATRIC:    Disorder of thought or mood.  [  x]  Yes  [  ]  No                                                           Anxiety [  ]  Yes  [ x ]  No                                                      Depression [  x]  Yes  [  ]  No                                                         Dementia [  x]  Yes  [   No    .Vital Signs Last 24 Hrs  T(C): 36.9 (07 Apr 2018 13:17), Max: 36.9 (07 Apr 2018 13:17)  T(F): 98.4 (07 Apr 2018 13:17), Max: 98.4 (07 Apr 2018 13:17)  HR: 81 (07 Apr 2018 13:17) (76 - 81)  BP: 105/72 (07 Apr 2018 13:17) (105/72 - 121/41)  BP(mean): --  RR: 16 (07 Apr 2018 13:17) (16 - 18)  SpO2: 100% (07 Apr 2018 13:17) (96% - 100%)      PHYSICAL EXAMINATION:    GENERAL: The patient is  _____in no apparent distress.     HEENT: Head is normocephalic and atraumatic. Extraocular muscles are intact. Mucous membranes are moist.     NECK: Supple. jvd [  ]  Yes  [ x ]  No    LUNGS: Clear to auscultation  [ x ]  Yes  [  ]  No                               wheezing, [  ]  Yes  [ x ]  No                                      rales  [  ]  Yes  [ x ]  No                                    rhonchi  [  ]  Yes  [  x]  No                 Respirations labored  [  ]  Yes  x[  ]  No    HEART: Regular rate and rhythm  [  x]  Yes  [  ]  No                                        Murmur [  ]  Yes  [ x ]  No                                              rub  [  ]  Yes  [ x x  No                                          gallop  [  ]  Yes  [xx  ]  No    ABDOMEN:                                 Soft, [xx  ]  Yes  [  ]  No                                             nontender [x  ]  Yes  [  ]  No                                             distended.[  ]  Yes  [  x]  No                            hepatosplenomegaly ,[  ]  Yes  [ x ]  No                                                    BS   [x  ]  Yes  [  ]  No    EXTREMITIES:                cyanosis, [  ]  Yes  [ x ]  No                                       clubbing,   [  ]  Yes  [x  ]  No                                               rash, [  ]  Yes  [ x ]  No                                           edema.  [ x ]  Yes  [  ]  No    NEUROLOGIC: Alert and Oriented  [x  ] Person [  ] Time  [ ] Place                                    Cranial nerves intact    [  ]  Yes  [  ]  No                                               sensory Intact  [  ]  Yes  [  ]  No                                                  motor WNL   [  ]  Yes  [  ]  No                                                Tony Paresis  [  ]  Yes  [  ]  No  DTR  babinski+ or -    LABS:                        10.9   6.6   )-----------( 165      ( 07 Apr 2018 07:14 )             34.9     04-07    141  |  110<H>  |  23<H>  ----------------------------<  98  4.0   |  25  |  1.04    Ca    9.1      07 Apr 2018 07:14  Phos  2.0     04-06  Mg     2.3     04-07      PT/INR - ( 06 Apr 2018 05:54 )   PT: 12.0 sec;   INR: 1.10 ratio                     Serum Pro-Brain Natriuretic Peptide: 1201 pg/mL (04-05-18 @ 06:18)      Procalcitonin, Serum: 0.28 ng/mL (04-05-18 @ 12:37)          LABS:                        11.3   6.6   )-----------( 169      ( 06 Apr 2018 05:54 )             35.3     04-06    139  |  107  |  32<H>  ----------------------------<  92  3.6   |  26  |  1.14    Ca    9.2      06 Apr 2018 05:54  Phos  2.0     04-06  Mg     2.3     04-06    TPro  8.1  /  Alb  3.9  /  TBili  0.7  /  DBili  x   /  AST  32  /  ALT  22  /  AlkPhos  132<H>  04-04    PT/INR - ( 06 Apr 2018 05:54 )   PT: 12.0 sec;   INR: 1.10 ratio         PTT - ( 04 Apr 2018 16:17 )  PTT:39.3 sec      CARDIAC MARKERS ( 05 Apr 2018 06:18 )  0.033 ng/mL / x     / 60 U/L / x     / 2.3 ng/mL  CARDIAC MARKERS ( 04 Apr 2018 16:17 )  0.040 ng/mL / x     / x     / x     / 3.4 ng/mL        Serum Pro-Brain Natriuretic Peptide: 1201 pg/mL (04-05-18 @ 06:18)      Procalcitonin, Serum: 0.28 ng/mL (04-05-18 @ 12:37)      MICROBIOLOGY:    RADIOLOGY & ADDITIONAL STUDIES:< from: US Duplex Venous Lower Ext Complete, Bilateral (04.05.18 @ 12:11) >  Impression: Noevidence for deep venous thrombosis within the bilateral   common femoral venous, superficial femoral venous or popliteal venous   segments.        < end of copied text >  < from: Xray Ankle Complete 3 Views, Right (04.05.18 @ 07:56) >  IMPRESSION: Ankle swelling. Knee degeneration. Osteoporosis. No acute   bony finding.        < end of copied text >      CXR:    Ct scan chest:    ekg;    echo:

## 2018-04-08 RX ADMIN — ZINC SULFATE TAB 220 MG (50 MG ZINC EQUIVALENT) 220 MILLIGRAM(S): 220 (50 ZN) TAB at 11:41

## 2018-04-08 RX ADMIN — ATORVASTATIN CALCIUM 10 MILLIGRAM(S): 80 TABLET, FILM COATED ORAL at 21:46

## 2018-04-08 RX ADMIN — HEPARIN SODIUM 5000 UNIT(S): 5000 INJECTION INTRAVENOUS; SUBCUTANEOUS at 05:36

## 2018-04-08 RX ADMIN — Medication 1 TABLET(S): at 11:46

## 2018-04-08 RX ADMIN — Medication 50 MILLIGRAM(S): at 05:36

## 2018-04-08 RX ADMIN — Medication 3 MILLIGRAM(S): at 21:46

## 2018-04-08 RX ADMIN — Medication 100 MILLIGRAM(S): at 13:57

## 2018-04-08 RX ADMIN — SPIRONOLACTONE 25 MILLIGRAM(S): 25 TABLET, FILM COATED ORAL at 05:36

## 2018-04-08 RX ADMIN — HEPARIN SODIUM 5000 UNIT(S): 5000 INJECTION INTRAVENOUS; SUBCUTANEOUS at 13:57

## 2018-04-08 RX ADMIN — Medication 100 MILLIGRAM(S): at 05:36

## 2018-04-08 RX ADMIN — Medication 100 MILLIGRAM(S): at 17:26

## 2018-04-08 RX ADMIN — PANTOPRAZOLE SODIUM 40 MILLIGRAM(S): 20 TABLET, DELAYED RELEASE ORAL at 05:36

## 2018-04-08 RX ADMIN — MIRTAZAPINE 15 MILLIGRAM(S): 45 TABLET, ORALLY DISINTEGRATING ORAL at 21:46

## 2018-04-08 RX ADMIN — CLOPIDOGREL BISULFATE 75 MILLIGRAM(S): 75 TABLET, FILM COATED ORAL at 11:34

## 2018-04-08 RX ADMIN — Medication 50 MILLIGRAM(S): at 17:26

## 2018-04-08 RX ADMIN — Medication 100 MILLIGRAM(S): at 21:46

## 2018-04-08 RX ADMIN — HEPARIN SODIUM 5000 UNIT(S): 5000 INJECTION INTRAVENOUS; SUBCUTANEOUS at 21:46

## 2018-04-08 RX ADMIN — LORATADINE 10 MILLIGRAM(S): 10 TABLET ORAL at 11:46

## 2018-04-08 NOTE — PROGRESS NOTE ADULT - SUBJECTIVE AND OBJECTIVE BOX
KELLY MARAVILLA  [id number]    Patient is a 89y old  Female who presents with a chief complaint of R leg pain (05 Apr 2018 02:46)    patient seen and examined    s no c/o offered    ..MEDICATIONS  (STANDING):  atorvastatin 10 milliGRAM(s) Oral at bedtime  calcium carbonate 1250 mG + Vitamin D (OsCal 500 + D) 1 Tablet(s) Oral daily  ciprofloxacin   IVPB 200 milliGRAM(s) IV Intermittent every 12 hours  clopidogrel Tablet 75 milliGRAM(s) Oral daily  heparin  Injectable 5000 Unit(s) SubCutaneous every 8 hours  lactated ringers. 1000 milliLiter(s) (75 mL/Hr) IV Continuous <Continuous>  loratadine 10 milliGRAM(s) Oral daily  melatonin 3 milliGRAM(s) Oral at bedtime  metoprolol tartrate 50 milliGRAM(s) Oral two times a day  metroNIDAZOLE  IVPB 500 milliGRAM(s) IV Intermittent every 8 hours  mirtazapine 15 milliGRAM(s) Oral at bedtime  pantoprazole    Tablet 40 milliGRAM(s) Oral before breakfast  spironolactone 25 milliGRAM(s) Oral daily  zinc sulfate 220 milliGRAM(s) Oral daily    MEDICATIONS  (PRN):          Allergies    Keppra (Unknown)  penicillin (Unknown)    Intolerances        PAST MEDICAL & SURGICAL HISTORY:  Chronic constipation  hypertension  ventral hernia  History of gastroesophageal reflux (GERD)  COPD  CHF  s/p exploratory laparotomy for SBO x 2: 2000 and in 2002  H/O: hysterectomy  Hx of appendectomy      FAMILY HISTORY:      SOCIAL HISTORY  Smoking History:     REVIEW OF SYSTEMS:    CONSTITUTIONAL:  Fevers [  ]  Yes  [ x ]  No                                   chills [  ]  Yes  [x  ]  No                               sweats  [  ]  Yes  [ x ]  No                                fatigue [ x ]  Yes  [  ]  No    HEENT:  Eyes:  Diplopia or blurred vision [  ]  Yes  [x  ]  No    ENT:                        earache  [  ]  Yes  [  x]  No                             sore throat  [  ]  Yes  [ x ]  No                            runny nose.  [  ]  Yes  [  x]  No    CARDIOVASCULAR:       chest pain  [  ]  Yes  [  ]  No                        squeezing, tightness,  [  ]  Yes  [  ]  No                                      palpitations.  [  ]  Yes  [  ]  No    RESPIRATORY:             Cough [  ]  Yes  [ x ]  No                                  Wheezing [  ]  Yes  x[  ]  No                                Hemoptysis [  ]  Yes  [ x ]  No                                      Sputum [  ]  Yes  [ x ]  No                   Shortness of Breathe [  ]  Yes  [ x ]  No    GASTROINTESTINAL:      Abdominal pain  [  ]  Yes  [ x]  No                                                    Nausea  [  ]  Yes  [x  ]  No                                                   Vomiting [  ]  Yes  [ x ]  No                                              Constipation [  ]  Yes  [ x ]  No                                                    Diarrhea [x  ]  Yes  [  ]  No, mild    GENITOURINARY:           Incontinence [ x ]  Yes  [  ]  No                                                Dysuria [  ]  Yes  [x  ]  No                                      Blood in Urine  [  ]  Yes  [xx  ]  No                          Frequency or urgency.  [  ]  Yes  [  x]  No    NEUROLOGIC:                     Paresthesias  [  ]  Yes  [ x ]  No                                             fasciculations  [  ]  Yes  [ x ]  No                                seizures or weakness.  [  ]  Yes  [  x]  No                                                   Headache [  ]  Yes  [ x ]  No                                  Loss of Conciousness [  ]  Yes  [  x]  No                                                     Insomnia [  ]  Yes  [  x]  No    PSYCHIATRIC:    Disorder of thought or mood.  [  x]  Yes  [  ]  No                                                           Anxiety [  ]  Yes  [ x ]  No                                                      Depression [  x]  Yes  [  ]  No                                                         Dementia [  x]  Yes  [   No    .Vital Signs Last 24 Hrs  T(C): 36.7 (08 Apr 2018 14:36), Max: 36.7 (08 Apr 2018 14:36)  T(F): 98 (08 Apr 2018 14:36), Max: 98 (08 Apr 2018 14:36)  HR: 86 (08 Apr 2018 16:51) (80 - 89)  BP: 124/66 (08 Apr 2018 16:51) (116/70 - 145/60)  BP(mean): --  RR: 16 (08 Apr 2018 14:36) (16 - 18)  SpO2: 98% (08 Apr 2018 14:36) (97% - 98%)    PHYSICAL EXAMINATION:    GENERAL: The patient is  _____in no apparent distress.     HEENT: Head is normocephalic and atraumatic. Extraocular muscles are intact. Mucous membranes are moist.     NECK: Supple. jvd [  ]  Yes  [ x ]  No    LUNGS: Clear to auscultation  [ x ]  Yes  [  ]  No                               wheezing, [  ]  Yes  [ x ]  No                                      rales  [  ]  Yes  [ x ]  No                                    rhonchi  [  ]  Yes  [  x]  No                 Respirations labored  [  ]  Yes  x[  ]  No    HEART: Regular rate and rhythm  [  x]  Yes  [  ]  No                                        Murmur [  ]  Yes  [ x ]  No                                              rub  [  ]  Yes  [ x x  No                                          gallop  [  ]  Yes  [xx  ]  No    ABDOMEN:                                 Soft, [xx  ]  Yes  [  ]  No                                             nontender [x  ]  Yes  [  ]  No                                             distended.[  ]  Yes  [  x]  No                            hepatosplenomegaly ,[  ]  Yes  [ x ]  No                                                    BS   [x  ]  Yes  [  ]  No    EXTREMITIES:                cyanosis, [  ]  Yes  [ x ]  No                                       clubbing,   [  ]  Yes  [x  ]  No                                               rash, [  ]  Yes  [ x ]  No                                           edema.  [ x ]  Yes  [  ]  No    NEUROLOGIC: Alert and Oriented  [x  ] Person [  ] Time  [ ] Place                                    Cranial nerves intact    [  ]  Yes  [  ]  No                                               sensory Intact  [  ]  Yes  [  ]  No                                                  motor WNL   [  ]  Yes  [  ]  No                                                Tony Paresis  [  ]  Yes  [  ]  No  DTR  babinski+ or -.  < from: Transthoracic Echocardiogram (04.05.18 @ 07:31) >  CONCLUSIONS:  1. Bioprosthetic mitral valve replacement. Mean transmitral  valve gradient equals 7.5 mm Hg (at a HR of 94 BPM) which  is elevated in the setting of a bioprosthetic mitral valve.  2. Normal left ventricular internal dimensions and wall  thicknesses.  3. Endocardium not well visualized; grossly normal left  ventricular systolic function. Segmental wall motion could  not be assessed.  4. Right ventricle not well visualized. A device lead is  visualized in the right heart.    ------------------------------------------------------------------------  Confirmed on  4/5/2018 - 14:35:02 by Braulio Perera MD    < end of copied text >    LABS:                        10.9   6.6   )-----------( 165      ( 07 Apr 2018 07:14 )             34.9     04-07    141  |  110<H>  |  23<H>  ----------------------------<  98  4.0   |  25  |  1.04    Ca    9.1      07 Apr 2018 07:14  Phos  2.0     04-06  Mg     2.3     04-07      PT/INR - ( 06 Apr 2018 05:54 )   PT: 12.0 sec;   INR: 1.10 ratio                     Serum Pro-Brain Natriuretic Peptide: 1201 pg/mL (04-05-18 @ 06:18)      Procalcitonin, Serum: 0.28 ng/mL (04-05-18 @ 12:37)          LABS:                        11.3   6.6   )-----------( 169      ( 06 Apr 2018 05:54 )             35.3     04-06    139  |  107  |  32<H>  ----------------------------<  92  3.6   |  26  |  1.14    Ca    9.2      06 Apr 2018 05:54  Phos  2.0     04-06  Mg     2.3     04-06    TPro  8.1  /  Alb  3.9  /  TBili  0.7  /  DBili  x   /  AST  32  /  ALT  22  /  AlkPhos  132<H>  04-04    PT/INR - ( 06 Apr 2018 05:54 )   PT: 12.0 sec;   INR: 1.10 ratio         PTT - ( 04 Apr 2018 16:17 )  PTT:39.3 sec      CARDIAC MARKERS ( 05 Apr 2018 06:18 )  0.033 ng/mL / x     / 60 U/L / x     / 2.3 ng/mL  CARDIAC MARKERS ( 04 Apr 2018 16:17 )  0.040 ng/mL / x     / x     / x     / 3.4 ng/mL        Serum Pro-Brain Natriuretic Peptide: 1201 pg/mL (04-05-18 @ 06:18)      Procalcitonin, Serum: 0.28 ng/mL (04-05-18 @ 12:37)      MICROBIOLOGY:    RADIOLOGY & ADDITIONAL STUDIES:< from: US Duplex Venous Lower Ext Complete, Bilateral (04.05.18 @ 12:11) >  Impression: Noevidence for deep venous thrombosis within the bilateral   common femoral venous, superficial femoral venous or popliteal venous   segments.        < end of copied text >  < from: Xray Ankle Complete 3 Views, Right (04.05.18 @ 07:56) >  IMPRESSION: Ankle swelling. Knee degeneration. Osteoporosis. No acute   bony finding.        < end of copied text >      CXR:    Ct scan chest:    ekg;    echo:

## 2018-04-09 ENCOUNTER — TRANSCRIPTION ENCOUNTER (OUTPATIENT)
Age: 83
End: 2018-04-09

## 2018-04-09 RX ORDER — METRONIDAZOLE 500 MG
500 TABLET ORAL EVERY 8 HOURS
Qty: 0 | Refills: 0 | Status: DISCONTINUED | OUTPATIENT
Start: 2018-04-09 | End: 2018-04-10

## 2018-04-09 RX ORDER — CIPROFLOXACIN LACTATE 400MG/40ML
500 VIAL (ML) INTRAVENOUS EVERY 12 HOURS
Qty: 0 | Refills: 0 | Status: DISCONTINUED | OUTPATIENT
Start: 2018-04-09 | End: 2018-04-10

## 2018-04-09 RX ORDER — SODIUM,POTASSIUM PHOSPHATES 278-250MG
1 POWDER IN PACKET (EA) ORAL
Qty: 0 | Refills: 0 | Status: DISCONTINUED | OUTPATIENT
Start: 2018-04-09 | End: 2018-04-10

## 2018-04-09 RX ADMIN — SPIRONOLACTONE 25 MILLIGRAM(S): 25 TABLET, FILM COATED ORAL at 05:27

## 2018-04-09 RX ADMIN — Medication 50 MILLIGRAM(S): at 05:27

## 2018-04-09 RX ADMIN — ATORVASTATIN CALCIUM 10 MILLIGRAM(S): 80 TABLET, FILM COATED ORAL at 21:59

## 2018-04-09 RX ADMIN — Medication 1 TABLET(S): at 21:58

## 2018-04-09 RX ADMIN — Medication 100 MILLIGRAM(S): at 05:27

## 2018-04-09 RX ADMIN — HEPARIN SODIUM 5000 UNIT(S): 5000 INJECTION INTRAVENOUS; SUBCUTANEOUS at 13:33

## 2018-04-09 RX ADMIN — Medication 1 TABLET(S): at 21:59

## 2018-04-09 RX ADMIN — LORATADINE 10 MILLIGRAM(S): 10 TABLET ORAL at 12:35

## 2018-04-09 RX ADMIN — Medication 500 MILLIGRAM(S): at 17:10

## 2018-04-09 RX ADMIN — MIRTAZAPINE 15 MILLIGRAM(S): 45 TABLET, ORALLY DISINTEGRATING ORAL at 21:59

## 2018-04-09 RX ADMIN — CLOPIDOGREL BISULFATE 75 MILLIGRAM(S): 75 TABLET, FILM COATED ORAL at 12:35

## 2018-04-09 RX ADMIN — Medication 1 TABLET(S): at 12:35

## 2018-04-09 RX ADMIN — Medication 3 MILLIGRAM(S): at 21:59

## 2018-04-09 RX ADMIN — ZINC SULFATE TAB 220 MG (50 MG ZINC EQUIVALENT) 220 MILLIGRAM(S): 220 (50 ZN) TAB at 12:35

## 2018-04-09 RX ADMIN — HEPARIN SODIUM 5000 UNIT(S): 5000 INJECTION INTRAVENOUS; SUBCUTANEOUS at 21:59

## 2018-04-09 RX ADMIN — Medication 50 MILLIGRAM(S): at 17:11

## 2018-04-09 RX ADMIN — HEPARIN SODIUM 5000 UNIT(S): 5000 INJECTION INTRAVENOUS; SUBCUTANEOUS at 05:27

## 2018-04-09 RX ADMIN — PANTOPRAZOLE SODIUM 40 MILLIGRAM(S): 20 TABLET, DELAYED RELEASE ORAL at 05:27

## 2018-04-09 RX ADMIN — Medication 1 TABLET(S): at 17:11

## 2018-04-09 RX ADMIN — Medication 500 MILLIGRAM(S): at 21:59

## 2018-04-09 NOTE — CONSULT NOTE ADULT - SUBJECTIVE AND OBJECTIVE BOX
GI INITIAL CONSULT    HPI: 89F w/stated PMH p/w watery nonbloody diarrhea that has been improving with IV abx. CT showed possible mild colitis. Pt does not report any abd pain, N/V, bleeding or weight loss.    PMH: CHF, COPD, GERD, HTN, ventral hernia, hard of hearing  PSH: appendectomy, hysterectomy, SBO s/p ex-lap and JENNYFER x2    Meds: MEDICATIONS  (STANDING):  atorvastatin 10 milliGRAM(s) Oral at bedtime  calcium carbonate 1250 mG + Vitamin D (OsCal 500 + D) 1 Tablet(s) Oral daily  ciprofloxacin     Tablet 500 milliGRAM(s) Oral every 12 hours  clopidogrel Tablet 75 milliGRAM(s) Oral daily  heparin  Injectable 5000 Unit(s) SubCutaneous every 8 hours  lactated ringers. 1000 milliLiter(s) (75 mL/Hr) IV Continuous <Continuous>  loratadine 10 milliGRAM(s) Oral daily  melatonin 3 milliGRAM(s) Oral at bedtime  metoprolol tartrate 50 milliGRAM(s) Oral two times a day  metroNIDAZOLE    Tablet 500 milliGRAM(s) Oral every 8 hours  mirtazapine 15 milliGRAM(s) Oral at bedtime  pantoprazole    Tablet 40 milliGRAM(s) Oral before breakfast  potassium acid phosphate/sodium acid phosphate tablet (K-PHOS No. 2) 1 Tablet(s) Oral four times a day with meals  spironolactone 25 milliGRAM(s) Oral daily  zinc sulfate 220 milliGRAM(s) Oral daily    SH: noncontributory  FH: noncontributory  ROS:  CONSTITUTIONAL: No fever, weight loss, or fatigue  EYES: No eye pain, visual disturbances, or discharge  ENT:  No difficulty hearing, tinnitus, vertigo; No sinus or throat pain  NECK: No pain or stiffness  RESPIRATORY: No cough, wheezing, chills or hemoptysis, shortness of Breath  CARDIOVASCULAR: No chest pain, palpitations, passing out, dizziness, or leg swelling  GASTROINTESTINAL: see HPI  GENITOURINARY: No dysuria, frequency, hematuria, or incontinence  NEUROLOGICAL: No headaches, memory loss, loss of strength, numbness, or tremors  SKIN: No itching, burning, rashes, or lesions   MUSCULOSKELETAL: No arthralgia, myalgia, or back pain.     Vitals: T(C): 36.2 (04-09-18 @ 14:10)  T(F): 97.2 (04-09-18 @ 14:10), Max: 98.8 (04-08-18 @ 22:15)  HR: 108 (04-09-18 @ 17:17) (78 - 108)  BP: 141/78 (04-09-18 @ 17:17) (105/89 - 141/78)    Gen: NAD  CVS: RRR; S1/S2  Chest: CTABL  Abd: S/NT/mildly distended    Labs reviewed    CT Abdomen and Pelvis w/ Oral Cont (04.04.18 @ 20:27)  IMPRESSION:   Thickening versus underdistention extending from distal transverse colon   to sigmoid colon. Correlate for infectious, ischemic, or inflammatory   colitis.

## 2018-04-09 NOTE — PHYSICAL THERAPY INITIAL EVALUATION ADULT - PLANNED THERAPY INTERVENTIONS, PT EVAL
postural re-education/strengthening/gait training/transfer training/balance training/bed mobility training/ROM

## 2018-04-09 NOTE — CONSULT NOTE ADULT - ASSESSMENT
89F w/multiple medical comorbidities developed acute diarrhea.  -likely infectious etiology; C. diff (-)  -greatly improved with empiric abx  -CT shows mild colitis vs. underdistension, likely due to infectious diarrhea  -given improving diarrhea no further w/u is necessary  -complete abx tx as per primary team  -please reconsult prn

## 2018-04-09 NOTE — DISCHARGE NOTE ADULT - PATIENT PORTAL LINK FT
You can access the [facility] Patient Portal, offered by [facility], by registering with the following website: [email or web address]

## 2018-04-09 NOTE — DISCHARGE NOTE ADULT - HOSPITAL COURSE
89 y/o female with PMHx of COPD, CHF, chronic ventral hernias and depression was sent in from NH  with c/o "R leg pain, unable to ambulate". She is admitted for colitis and to rule out DVT. no more diarrhea. tolerating mechanical soft diet. GI eval. 89 y/o female with PMHx of COPD, CHF, chronic ventral hernias and depression was sent in from NH  with c/o "R leg pain, unable to ambulate". She is admitted for colitis and to rule out DVT. no more diarrhea. tolerating mechanical soft diet. GI eval was done and agrees with the plan.   PT eval was done and recommends ANDERS.     Patient stabe for discharge 89 y/o female with PMHx of COPD, dementia, CHF, chronic ventral hernias and depression was sent in from NH  with c/o "R leg pain, unable to ambulate". She is admitted for colitis and to rule out DVT. no more diarrhea. tolerating mechanical soft diet. GI eval was done and agrees with the plan.   PT eval was done and recommends ANDERS.     Patient stable for discharge

## 2018-04-09 NOTE — DISCHARGE NOTE ADULT - CARE PROVIDER_API CALL
Jordi Erickson), Gastroenterology  [address] [place] [address]  Phone: [phone]  Fax: [phone]

## 2018-04-09 NOTE — DISCHARGE NOTE ADULT - PLAN OF CARE
resolution and prevention of future episodes please continue antibiotics as directed. continue diet resolution continue rehabilitation

## 2018-04-09 NOTE — PROGRESS NOTE ADULT - SUBJECTIVE AND OBJECTIVE BOX
KELLY MARAVILLA  [id number]    Patient is a 89y old  Female who presents with a chief complaint of R leg pain (05 Apr 2018 02:46)    patient seen and examined    s no c/o offered, oob in chair     ..MEDICATIONS  (STANDING):  atorvastatin 10 milliGRAM(s) Oral at bedtime  calcium carbonate 1250 mG + Vitamin D (OsCal 500 + D) 1 Tablet(s) Oral daily  ciprofloxacin   IVPB 200 milliGRAM(s) IV Intermittent every 12 hours  clopidogrel Tablet 75 milliGRAM(s) Oral daily  heparin  Injectable 5000 Unit(s) SubCutaneous every 8 hours  lactated ringers. 1000 milliLiter(s) (75 mL/Hr) IV Continuous <Continuous>  loratadine 10 milliGRAM(s) Oral daily  melatonin 3 milliGRAM(s) Oral at bedtime  metoprolol tartrate 50 milliGRAM(s) Oral two times a day  metroNIDAZOLE  IVPB 500 milliGRAM(s) IV Intermittent every 8 hours  mirtazapine 15 milliGRAM(s) Oral at bedtime  pantoprazole    Tablet 40 milliGRAM(s) Oral before breakfast  spironolactone 25 milliGRAM(s) Oral daily  zinc sulfate 220 milliGRAM(s) Oral daily    MEDICATIONS  (PRN):            Allergies    Keppra (Unknown)  penicillin (Unknown)    Intolerances        PAST MEDICAL & SURGICAL HISTORY:  Chronic constipation  hypertension  ventral hernia  History of gastroesophageal reflux (GERD)  COPD  CHF  s/p exploratory laparotomy for SBO x 2: 2000 and in 2002  H/O: hysterectomy  Hx of appendectomy      FAMILY HISTORY:      SOCIAL HISTORY  Smoking History:     REVIEW OF SYSTEMS:    CONSTITUTIONAL:  Fevers [  ]  Yes  [ x ]  No                                   chills [  ]  Yes  [x  ]  No                               sweats  [  ]  Yes  [ x ]  No                                fatigue [ x ]  Yes  [  ]  No    HEENT:  Eyes:  Diplopia or blurred vision [  ]  Yes  [x  ]  No    ENT:                        earache  [  ]  Yes  [  x]  No                             sore throat  [  ]  Yes  [ x ]  No                            runny nose.  [  ]  Yes  [  x]  No    CARDIOVASCULAR:       chest pain  [  ]  Yes  [  ]  No                        squeezing, tightness,  [  ]  Yes  [  ]  No                                      palpitations.  [  ]  Yes  [  ]  No    RESPIRATORY:             Cough [  ]  Yes  [ x ]  No                                  Wheezing [  ]  Yes  x[  ]  No                                Hemoptysis [  ]  Yes  [ x ]  No                                      Sputum [  ]  Yes  [ x ]  No                   Shortness of Breathe [  ]  Yes  [ x ]  No    GASTROINTESTINAL:      Abdominal pain  [  ]  Yes  [ x]  No                                                    Nausea  [  ]  Yes  [x  ]  No                                                   Vomiting [  ]  Yes  [ x ]  No                                              Constipation [  ]  Yes  [ x ]  No                                                    Diarrhea [x  ]  Yes  [  ]  No, mild    GENITOURINARY:           Incontinence [ x ]  Yes  [  ]  No                                                Dysuria [  ]  Yes  [x  ]  No                                      Blood in Urine  [  ]  Yes  [xx  ]  No                          Frequency or urgency.  [  ]  Yes  [  x]  No    NEUROLOGIC:                     Paresthesias  [  ]  Yes  [ x ]  No                                             fasciculations  [  ]  Yes  [ x ]  No                                seizures or weakness.  [  ]  Yes  [  x]  No                                                   Headache [  ]  Yes  [ x ]  No                                  Loss of Conciousness [  ]  Yes  [  x]  No                                                     Insomnia [  ]  Yes  [  x]  No    PSYCHIATRIC:    Disorder of thought or mood.  [  x]  Yes  [  ]  No                                                           Anxiety [  ]  Yes  [ x ]  No                                                      Depression [  x]  Yes  [  ]  No                                                         Dementia [  x]  Yes  [   No  .Vital Signs Last 24 Hrs  T(C): 36.6 (09 Apr 2018 05:10), Max: 37.1 (08 Apr 2018 22:15)  T(F): 97.9 (09 Apr 2018 05:10), Max: 98.8 (08 Apr 2018 22:15)  HR: 82 (09 Apr 2018 05:10) (78 - 86)  BP: 139/68 (09 Apr 2018 05:10) (105/89 - 139/68)  BP(mean): --  RR: 16 (09 Apr 2018 05:10) (16 - 17)  SpO2: 95% (09 Apr 2018 05:10) (95% - 98%)    PHYSICAL EXAMINATION:    GENERAL: The patient is  _____in no apparent distress.     HEENT: Head is normocephalic and atraumatic. Extraocular muscles are intact. Mucous membranes are moist.     NECK: Supple. jvd [  ]  Yes  [ x ]  No    LUNGS: Clear to auscultation  [ x ]  Yes  [  ]  No                               wheezing, [  ]  Yes  [ x ]  No                                      rales  [  ]  Yes  [ x ]  No                                    rhonchi  [  ]  Yes  [  x]  No                 Respirations labored  [  ]  Yes  x[  ]  No    HEART: Regular rate and rhythm  [  x]  Yes  [  ]  No                                        Murmur [  ]  Yes  [ x ]  No                                              rub  [  ]  Yes  [ x x  No                                          gallop  [  ]  Yes  [xx  ]  No    ABDOMEN:                                 Soft, [xx  ]  Yes  [  ]  No                                             nontender [x  ]  Yes  [  ]  No                                             distended.[  ]  Yes  [  x]  No                            hepatosplenomegaly ,[  ]  Yes  [ x ]  No                                                    BS   [x  ]  Yes  [  ]  No    EXTREMITIES:                cyanosis, [  ]  Yes  [ x ]  No                                       clubbing,   [  ]  Yes  [x  ]  No                                               rash, [  ]  Yes  [ x ]  No                                           edema.  [ x ]  Yes  [  ]  No    NEUROLOGIC: Alert and Oriented  [x  ] Person [  ] Time  [ ] Place                                    Cranial nerves intact    [  ]  Yes  [  ]  No                                               sensory Intact  [  ]  Yes  [  ]  No                                                  motor WNL   [  ]  Yes  [  ]  No                                                Tony Paresis  [  ]  Yes  [  ]  No  LABS:  DTR  babinski+ or -.  < from: Transthoracic Echocardiogram (04.05.18 @ 07:31) >  CONCLUSIONS:  1. Bioprosthetic mitral valve replacement. Mean transmitral  valve gradient equals 7.5 mm Hg (at a HR of 94 BPM) which  is elevated in the setting of a bioprosthetic mitral valve.  2. Normal left ventricular internal dimensions and wall  thicknesses.  3. Endocardium not well visualized; grossly normal left  ventricular systolic function. Segmental wall motion could  not be assessed.  4. Right ventricle not well visualized. A device lead is  visualized in the right heart.    ------------------------------------------------------------------------  Confirmed on  4/5/2018 - 14:35:02 by Braulio Perera MD    < end of copied text >    LABS:                        10.9   6.6   )-----------( 165      ( 07 Apr 2018 07:14 )             34.9     04-07    141  |  110<H>  |  23<H>  ----------------------------<  98  4.0   |  25  |  1.04    Ca    9.1      07 Apr 2018 07:14  Phos  2.0     04-06  Mg     2.3     04-07      PT/INR - ( 06 Apr 2018 05:54 )   PT: 12.0 sec;   INR: 1.10 ratio                     Serum Pro-Brain Natriuretic Peptide: 1201 pg/mL (04-05-18 @ 06:18)      Procalcitonin, Serum: 0.28 ng/mL (04-05-18 @ 12:37)          LABS:                        11.3   6.6   )-----------( 169      ( 06 Apr 2018 05:54 )             35.3     04-06    139  |  107  |  32<H>  ----------------------------<  92  3.6   |  26  |  1.14    Ca    9.2      06 Apr 2018 05:54  Phos  2.0     04-06  Mg     2.3     04-06    TPro  8.1  /  Alb  3.9  /  TBili  0.7  /  DBili  x   /  AST  32  /  ALT  22  /  AlkPhos  132<H>  04-04    PT/INR - ( 06 Apr 2018 05:54 )   PT: 12.0 sec;   INR: 1.10 ratio         PTT - ( 04 Apr 2018 16:17 )  PTT:39.3 sec      CARDIAC MARKERS ( 05 Apr 2018 06:18 )  0.033 ng/mL / x     / 60 U/L / x     / 2.3 ng/mL  CARDIAC MARKERS ( 04 Apr 2018 16:17 )  0.040 ng/mL / x     / x     / x     / 3.4 ng/mL        Serum Pro-Brain Natriuretic Peptide: 1201 pg/mL (04-05-18 @ 06:18)      Procalcitonin, Serum: 0.28 ng/mL (04-05-18 @ 12:37)      MICROBIOLOGY:    RADIOLOGY & ADDITIONAL STUDIES:< from: US Duplex Venous Lower Ext Complete, Bilateral (04.05.18 @ 12:11) >  Impression: Noevidence for deep venous thrombosis within the bilateral   common femoral venous, superficial femoral venous or popliteal venous   segments.        < end of copied text >  < from: Xray Ankle Complete 3 Views, Right (04.05.18 @ 07:56) >  IMPRESSION: Ankle swelling. Knee degeneration. Osteoporosis. No acute   bony finding.        < end of copied text >      CXR:    Ct scan chest:    ekg;    echo: KELLY MARAVILLA.  [id number]    Patient is a 89y old  Female who presents with a chief complaint of R leg pain (05 Apr 2018 02:46)    patient seen and examined    s no c/o offered, oob in chair     ..MEDICATIONS  (STANDING):  atorvastatin 10 milliGRAM(s) Oral at bedtime  calcium carbonate 1250 mG + Vitamin D (OsCal 500 + D) 1 Tablet(s) Oral daily  ciprofloxacin   IVPB 200 milliGRAM(s) IV Intermittent every 12 hours  clopidogrel Tablet 75 milliGRAM(s) Oral daily  heparin  Injectable 5000 Unit(s) SubCutaneous every 8 hours  lactated ringers. 1000 milliLiter(s) (75 mL/Hr) IV Continuous <Continuous>  loratadine 10 milliGRAM(s) Oral daily  melatonin 3 milliGRAM(s) Oral at bedtime  metoprolol tartrate 50 milliGRAM(s) Oral two times a day  metroNIDAZOLE  IVPB 500 milliGRAM(s) IV Intermittent every 8 hours  mirtazapine 15 milliGRAM(s) Oral at bedtime  pantoprazole    Tablet 40 milliGRAM(s) Oral before breakfast  spironolactone 25 milliGRAM(s) Oral daily  zinc sulfate 220 milliGRAM(s) Oral daily    MEDICATIONS  (PRN):            Allergies    Keppra (Unknown)  penicillin (Unknown)    Intolerances        PAST MEDICAL & SURGICAL HISTORY:  Chronic constipation  hypertension  ventral hernia  History of gastroesophageal reflux (GERD)  COPD  CHF  s/p exploratory laparotomy for SBO x 2: 2000 and in 2002  H/O: hysterectomy  Hx of appendectomy      FAMILY HISTORY:      SOCIAL HISTORY  Smoking History:     REVIEW OF SYSTEMS:    CONSTITUTIONAL:  Fevers [  ]  Yes  [ x ]  No                                   chills [  ]  Yes  [x  ]  No                               sweats  [  ]  Yes  [ x ]  No                                fatigue [ x ]  Yes  [  ]  No    HEENT:  Eyes:  Diplopia or blurred vision [  ]  Yes  [x  ]  No    ENT:                        earache  [  ]  Yes  [  x]  No                             sore throat  [  ]  Yes  [ x ]  No                            runny nose.  [  ]  Yes  [  x]  No    CARDIOVASCULAR:       chest pain  [  ]  Yes  [  ]  No                        squeezing, tightness,  [  ]  Yes  [  ]  No                                      palpitations.  [  ]  Yes  [  ]  No    RESPIRATORY:             Cough [  ]  Yes  [ x ]  No                                  Wheezing [  ]  Yes  x[  ]  No                                Hemoptysis [  ]  Yes  [ x ]  No                                      Sputum [  ]  Yes  [ x ]  No                   Shortness of Breathe [  ]  Yes  [ x ]  No    GASTROINTESTINAL:      Abdominal pain  [  ]  Yes  [ x]  No                                                    Nausea  [  ]  Yes  [x  ]  No                                                   Vomiting [  ]  Yes  [ x ]  No                                              Constipation [  ]  Yes  [ x ]  No                                                    Diarrhea [x  ]  Yes  [  ]  No, mild    GENITOURINARY:           Incontinence [ x ]  Yes  [  ]  No                                                Dysuria [  ]  Yes  [x  ]  No                                      Blood in Urine  [  ]  Yes  [xx  ]  No                          Frequency or urgency.  [  ]  Yes  [  x]  No    NEUROLOGIC:                     Paresthesias  [  ]  Yes  [ x ]  No                                             fasciculations  [  ]  Yes  [ x ]  No                                seizures or weakness.  [  ]  Yes  [  x]  No                                                   Headache [  ]  Yes  [ x ]  No                                  Loss of Conciousness [  ]  Yes  [  x]  No                                                     Insomnia [  ]  Yes  [  x]  No    PSYCHIATRIC:    Disorder of thought or mood.  [  x]  Yes  [  ]  No                                                           Anxiety [  ]  Yes  [ x ]  No                                                      Depression [  x]  Yes  [  ]  No                                                         Dementia [  x]  Yes  [   No  .Vital Signs Last 24 Hrs  T(C): 36.6 (09 Apr 2018 05:10), Max: 37.1 (08 Apr 2018 22:15)  T(F): 97.9 (09 Apr 2018 05:10), Max: 98.8 (08 Apr 2018 22:15)  HR: 82 (09 Apr 2018 05:10) (78 - 86)  BP: 139/68 (09 Apr 2018 05:10) (105/89 - 139/68)  BP(mean): --  RR: 16 (09 Apr 2018 05:10) (16 - 17)  SpO2: 95% (09 Apr 2018 05:10) (95% - 98%)    PHYSICAL EXAMINATION:    GENERAL: The patient is  _____in no apparent distress.     HEENT: Head is normocephalic and atraumatic. Extraocular muscles are intact. Mucous membranes are moist.     NECK: Supple. jvd [  ]  Yes  [ x ]  No    LUNGS: Clear to auscultation  [ x ]  Yes  [  ]  No                               wheezing, [  ]  Yes  [ x ]  No                                      rales  [  ]  Yes  [ x ]  No                                    rhonchi  [  ]  Yes  [  x]  No                 Respirations labored  [  ]  Yes  x[  ]  No    HEART: Regular rate and rhythm  [  x]  Yes  [  ]  No                                        Murmur [  ]  Yes  [ x ]  No                                              rub  [  ]  Yes  [ x x  No                                          gallop  [  ]  Yes  [xx  ]  No    ABDOMEN:                                 Soft, [xx  ]  Yes  [  ]  No                                             nontender [x  ]  Yes  [  ]  No                                             distended.[  ]  Yes  [  x]  No                            hepatosplenomegaly ,[  ]  Yes  [ x ]  No                                                    BS   [x  ]  Yes  [  ]  No    EXTREMITIES:                cyanosis, [  ]  Yes  [ x ]  No                                       clubbing,   [  ]  Yes  [x  ]  No                                               rash, [  ]  Yes  [ x ]  No                                           edema.  [ x ]  Yes  [  ]  No    NEUROLOGIC: Alert and Oriented  [x  ] Person [  ] Time  [ ] Place                                    Cranial nerves intact    [  ]  Yes  [  ]  No                                               sensory Intact  [  ]  Yes  [  ]  No                                                  motor WNL   [  ]  Yes  [  ]  No                                                Tony Paresis  [  ]  Yes  [  ]  No  LABS:  DTR  babinski+ or -.  < from: Transthoracic Echocardiogram (04.05.18 @ 07:31) >  CONCLUSIONS:  1. Bioprosthetic mitral valve replacement. Mean transmitral  valve gradient equals 7.5 mm Hg (at a HR of 94 BPM) which  is elevated in the setting of a bioprosthetic mitral valve.  2. Normal left ventricular internal dimensions and wall  thicknesses.  3. Endocardium not well visualized; grossly normal left  ventricular systolic function. Segmental wall motion could  not be assessed.  4. Right ventricle not well visualized. A device lead is  visualized in the right heart.    ------------------------------------------------------------------------  Confirmed on  4/5/2018 - 14:35:02 by Braulio Perera MD    < end of copied text >    LABS:                        10.9   6.6   )-----------( 165      ( 07 Apr 2018 07:14 )             34.9     04-07    141  |  110<H>  |  23<H>  ----------------------------<  98  4.0   |  25  |  1.04    Ca    9.1      07 Apr 2018 07:14  Phos  2.0     04-06  Mg     2.3     04-07      PT/INR - ( 06 Apr 2018 05:54 )   PT: 12.0 sec;   INR: 1.10 ratio                     Serum Pro-Brain Natriuretic Peptide: 1201 pg/mL (04-05-18 @ 06:18)      Procalcitonin, Serum: 0.28 ng/mL (04-05-18 @ 12:37)          LABS:                        11.3   6.6   )-----------( 169      ( 06 Apr 2018 05:54 )             35.3     04-06    139  |  107  |  32<H>  ----------------------------<  92  3.6   |  26  |  1.14    Ca    9.2      06 Apr 2018 05:54  Phos  2.0     04-06  Mg     2.3     04-06    TPro  8.1  /  Alb  3.9  /  TBili  0.7  /  DBili  x   /  AST  32  /  ALT  22  /  AlkPhos  132<H>  04-04    PT/INR - ( 06 Apr 2018 05:54 )   PT: 12.0 sec;   INR: 1.10 ratio         PTT - ( 04 Apr 2018 16:17 )  PTT:39.3 sec      CARDIAC MARKERS ( 05 Apr 2018 06:18 )  0.033 ng/mL / x     / 60 U/L / x     / 2.3 ng/mL  CARDIAC MARKERS ( 04 Apr 2018 16:17 )  0.040 ng/mL / x     / x     / x     / 3.4 ng/mL        Serum Pro-Brain Natriuretic Peptide: 1201 pg/mL (04-05-18 @ 06:18)      Procalcitonin, Serum: 0.28 ng/mL (04-05-18 @ 12:37)      MICROBIOLOGY:    RADIOLOGY & ADDITIONAL STUDIES:< from: US Duplex Venous Lower Ext Complete, Bilateral (04.05.18 @ 12:11) >  Impression: Noevidence for deep venous thrombosis within the bilateral   common femoral venous, superficial femoral venous or popliteal venous   segments.        < end of copied text >  < from: Xray Ankle Complete 3 Views, Right (04.05.18 @ 07:56) >  IMPRESSION: Ankle swelling. Knee degeneration. Osteoporosis. No acute   bony finding.        < end of copied text >      CXR:    Ct scan chest:    ekg;    echo:

## 2018-04-09 NOTE — DISCHARGE NOTE ADULT - MEDICATION SUMMARY - MEDICATIONS TO TAKE
I will START or STAY ON the medications listed below when I get home from the hospital:    spironolactone  -- 25  by mouth once a day  -- Indication: For HTN (hypertension)    metroNIDAZOLE 500 mg oral tablet  -- 1 tab(s) by mouth every 8 hours for 5 days  -- Indication: For Colitis    mirtazapine  -- 15  by mouth once a day (at bedtime)  -- Indication: For Depression     loratadine 10 mg oral tablet  -- 1 tab(s) by mouth once a day  -- Indication: For Allergy    lovastatin  -- 40  orally  -- Indication: For HLD    atorvastatin 10 mg oral tablet  -- 1 tab(s) by mouth once a day (at bedtime)  -- Indication: For HLD    clopidogrel 75 mg oral tablet  -- 1 tab(s) by mouth once a day  -- Indication: For ppx    Metoprolol Tartrate 50 mg oral tablet  -- 1 tab(s) by mouth 2 times a day  -- Indication: For HTN (hypertension)    Senna 8.6 mg oral tablet  -- 1 tab(s) by mouth once a day (at bedtime)  -- Indication: For Constipation     bisacodyl  -- Indication: For Constipation     zinc (as gluconate) 50 mg oral tablet  -- Indication: For wound     Melatonin 3 mg oral tablet  -- 1 tab(s) by mouth once (at bedtime)  -- Indication: For insomnia     omeprazole 20 mg oral delayed release capsule  -- 1 cap(s) by mouth once a day  -- Indication: For GI ppx     ciprofloxacin 500 mg oral tablet  -- 1 tab(s) by mouth every 12 hours for 5 days  -- Indication: For Colitis     Oyst-Candelario-D  -- Indication: For vit d

## 2018-04-09 NOTE — PHYSICAL THERAPY INITIAL EVALUATION ADULT - CRITERIA FOR SKILLED THERAPEUTIC INTERVENTIONS
therapy frequency/anticipated discharge recommendation/impairments found/rehab potential/predicted duration of therapy intervention/functional limitations in following categories/risk reduction/prevention

## 2018-04-09 NOTE — DISCHARGE NOTE ADULT - MEDICATION SUMMARY - MEDICATIONS TO CHANGE
I will SWITCH the dose or number of times a day I take the medications listed below when I get home from the hospital:    furosemide  -- 80  by mouth 2 times a day

## 2018-04-09 NOTE — DISCHARGE NOTE ADULT - REASON FOR ADMISSION
R leg pain on prednisone.  11/10 continue steroid  11/11 on steroid  11/12 on oral steroid  11/13: cont oral steroids!!  11/14: Has pretty poor lungs secondary to steroids: Cont current therapy!!  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  11/27: cont on steroids  11/30: om 30 mg of prednisone  12/01:on 20 mg of prednisone now !!  12/02 Prednisone  12/3 on Prednisone  12/04:low taper of steroids  12/05: 10 mg of prednisone

## 2018-04-09 NOTE — DISCHARGE NOTE ADULT - CARE PLAN
Principal Discharge DX:	Colitis  Goal:	resolution and prevention of future episodes  Assessment and plan of treatment:	please continue antibiotics as directed. continue diet  Secondary Diagnosis:	Leg pain  Goal:	resolution  Assessment and plan of treatment:	continue rehabilitation

## 2018-04-10 VITALS
SYSTOLIC BLOOD PRESSURE: 138 MMHG | RESPIRATION RATE: 18 BRPM | HEART RATE: 78 BPM | DIASTOLIC BLOOD PRESSURE: 53 MMHG | TEMPERATURE: 98 F

## 2018-04-10 LAB
CULTURE RESULTS: SIGNIFICANT CHANGE UP
CULTURE RESULTS: SIGNIFICANT CHANGE UP
SPECIMEN SOURCE: SIGNIFICANT CHANGE UP
SPECIMEN SOURCE: SIGNIFICANT CHANGE UP

## 2018-04-10 PROCEDURE — 93970 EXTREMITY STUDY: CPT

## 2018-04-10 PROCEDURE — 83690 ASSAY OF LIPASE: CPT

## 2018-04-10 PROCEDURE — 84100 ASSAY OF PHOSPHORUS: CPT

## 2018-04-10 PROCEDURE — 83880 ASSAY OF NATRIURETIC PEPTIDE: CPT

## 2018-04-10 PROCEDURE — 82550 ASSAY OF CK (CPK): CPT

## 2018-04-10 PROCEDURE — 87493 C DIFF AMPLIFIED PROBE: CPT

## 2018-04-10 PROCEDURE — 97162 PT EVAL MOD COMPLEX 30 MIN: CPT

## 2018-04-10 PROCEDURE — 85027 COMPLETE CBC AUTOMATED: CPT

## 2018-04-10 PROCEDURE — 74176 CT ABD & PELVIS W/O CONTRAST: CPT

## 2018-04-10 PROCEDURE — 73610 X-RAY EXAM OF ANKLE: CPT

## 2018-04-10 PROCEDURE — 80053 COMPREHEN METABOLIC PANEL: CPT

## 2018-04-10 PROCEDURE — 87040 BLOOD CULTURE FOR BACTERIA: CPT

## 2018-04-10 PROCEDURE — 96375 TX/PRO/DX INJ NEW DRUG ADDON: CPT | Mod: 76

## 2018-04-10 PROCEDURE — 84484 ASSAY OF TROPONIN QUANT: CPT

## 2018-04-10 PROCEDURE — 83735 ASSAY OF MAGNESIUM: CPT

## 2018-04-10 PROCEDURE — 96374 THER/PROPH/DIAG INJ IV PUSH: CPT

## 2018-04-10 PROCEDURE — 93005 ELECTROCARDIOGRAM TRACING: CPT

## 2018-04-10 PROCEDURE — 82272 OCCULT BLD FECES 1-3 TESTS: CPT

## 2018-04-10 PROCEDURE — 82553 CREATINE MB FRACTION: CPT

## 2018-04-10 PROCEDURE — 85730 THROMBOPLASTIN TIME PARTIAL: CPT

## 2018-04-10 PROCEDURE — 85610 PROTHROMBIN TIME: CPT

## 2018-04-10 PROCEDURE — 73590 X-RAY EXAM OF LOWER LEG: CPT

## 2018-04-10 PROCEDURE — 99285 EMERGENCY DEPT VISIT HI MDM: CPT | Mod: 25

## 2018-04-10 PROCEDURE — 93306 TTE W/DOPPLER COMPLETE: CPT

## 2018-04-10 PROCEDURE — 84145 PROCALCITONIN (PCT): CPT

## 2018-04-10 PROCEDURE — 80048 BASIC METABOLIC PNL TOTAL CA: CPT

## 2018-04-10 PROCEDURE — 71045 X-RAY EXAM CHEST 1 VIEW: CPT

## 2018-04-10 RX ORDER — METRONIDAZOLE 500 MG
1 TABLET ORAL
Qty: 0 | Refills: 0 | COMMUNITY
Start: 2018-04-10

## 2018-04-10 RX ORDER — LOVASTATIN 20 MG
40 TABLET ORAL
Qty: 0 | Refills: 0 | COMMUNITY

## 2018-04-10 RX ORDER — FUROSEMIDE 40 MG
80 TABLET ORAL
Qty: 0 | Refills: 0 | COMMUNITY

## 2018-04-10 RX ORDER — CIPROFLOXACIN LACTATE 400MG/40ML
1 VIAL (ML) INTRAVENOUS
Qty: 0 | Refills: 0 | COMMUNITY
Start: 2018-04-10

## 2018-04-10 RX ORDER — ATORVASTATIN CALCIUM 80 MG/1
1 TABLET, FILM COATED ORAL
Qty: 0 | Refills: 0 | COMMUNITY
Start: 2018-04-10

## 2018-04-10 RX ADMIN — LORATADINE 10 MILLIGRAM(S): 10 TABLET ORAL at 11:43

## 2018-04-10 RX ADMIN — Medication 1 TABLET(S): at 12:05

## 2018-04-10 RX ADMIN — ZINC SULFATE TAB 220 MG (50 MG ZINC EQUIVALENT) 220 MILLIGRAM(S): 220 (50 ZN) TAB at 11:42

## 2018-04-10 RX ADMIN — CLOPIDOGREL BISULFATE 75 MILLIGRAM(S): 75 TABLET, FILM COATED ORAL at 11:42

## 2018-04-10 RX ADMIN — SPIRONOLACTONE 25 MILLIGRAM(S): 25 TABLET, FILM COATED ORAL at 06:38

## 2018-04-10 RX ADMIN — Medication 500 MILLIGRAM(S): at 06:38

## 2018-04-10 RX ADMIN — HEPARIN SODIUM 5000 UNIT(S): 5000 INJECTION INTRAVENOUS; SUBCUTANEOUS at 13:05

## 2018-04-10 RX ADMIN — Medication 500 MILLIGRAM(S): at 17:01

## 2018-04-10 RX ADMIN — Medication 1 TABLET(S): at 11:42

## 2018-04-10 RX ADMIN — HEPARIN SODIUM 5000 UNIT(S): 5000 INJECTION INTRAVENOUS; SUBCUTANEOUS at 06:38

## 2018-04-10 RX ADMIN — Medication 50 MILLIGRAM(S): at 06:38

## 2018-04-10 RX ADMIN — Medication 1 TABLET(S): at 08:18

## 2018-04-10 RX ADMIN — Medication 1 TABLET(S): at 17:01

## 2018-04-10 RX ADMIN — Medication 500 MILLIGRAM(S): at 13:05

## 2018-04-10 RX ADMIN — Medication 50 MILLIGRAM(S): at 17:01

## 2018-04-10 RX ADMIN — PANTOPRAZOLE SODIUM 40 MILLIGRAM(S): 20 TABLET, DELAYED RELEASE ORAL at 06:38

## 2018-04-10 NOTE — PROGRESS NOTE ADULT - PROBLEM SELECTOR PROBLEM 4
HTN (hypertension)
CHF (congestive heart failure)

## 2018-04-10 NOTE — PROGRESS NOTE ADULT - PROBLEM SELECTOR PLAN 3
not in exacerbation   - c/w Spironolactone 25 mg daily   - c/w plavix 75mg daily   - lasix on  hold  for now
metoprolol

## 2018-04-10 NOTE — PROGRESS NOTE ADULT - SUBJECTIVE AND OBJECTIVE BOX
KELLY MARAVILLA.  MRN-[id number]    Patient is a 89y old  Female who presents with a chief complaint of R leg pain (05 Apr 2018 02:46)    patient seen and examined    s doing better  .MEDICATIONS  (STANDING):  atorvastatin 10 milliGRAM(s) Oral at bedtime  calcium carbonate 1250 mG + Vitamin D (OsCal 500 + D) 1 Tablet(s) Oral daily  ciprofloxacin     Tablet 500 milliGRAM(s) Oral every 12 hours  clopidogrel Tablet 75 milliGRAM(s) Oral daily  heparin  Injectable 5000 Unit(s) SubCutaneous every 8 hours  lactated ringers. 1000 milliLiter(s) (75 mL/Hr) IV Continuous <Continuous>  loratadine 10 milliGRAM(s) Oral daily  melatonin 3 milliGRAM(s) Oral at bedtime  metoprolol tartrate 50 milliGRAM(s) Oral two times a day  metroNIDAZOLE    Tablet 500 milliGRAM(s) Oral every 8 hours  mirtazapine 15 milliGRAM(s) Oral at bedtime  pantoprazole    Tablet 40 milliGRAM(s) Oral before breakfast  potassium acid phosphate/sodium acid phosphate tablet (K-PHOS No. 2) 1 Tablet(s) Oral four times a day with meals  spironolactone 25 milliGRAM(s) Oral daily  zinc sulfate 220 milliGRAM(s) Oral daily    MEDICATIONS  (PRN):            Allergies    Keppra (Unknown)  penicillin (Unknown)    Intolerances        PAST MEDICAL & SURGICAL HISTORY:  Chronic constipation  hypertension  ventral hernia  History of gastroesophageal reflux (GERD)  COPD  CHF  s/p exploratory laparotomy for SBO x 2: 2000 and in 2002  H/O: hysterectomy  Hx of appendectomy      FAMILY HISTORY:      SOCIAL HISTORY  Smoking History:     REVIEW OF SYSTEMS:    CONSTITUTIONAL:  Fevers [  ]  Yes  [ x ]  No                                   chills [  ]  Yes  [x  ]  No                               sweats  [  ]  Yes  [ x ]  No                                fatigue [ x ]  Yes  [  ]  No    HEENT:  Eyes:  Diplopia or blurred vision [  ]  Yes  [x  ]  No    ENT:                        earache  [  ]  Yes  [  x]  No                             sore throat  [  ]  Yes  [ x ]  No                            runny nose.  [  ]  Yes  [  x]  No    CARDIOVASCULAR:       chest pain  [  ]  Yes  [  ]  No                        squeezing, tightness,  [  ]  Yes  [  ]  No                                      palpitations.  [  ]  Yes  [  ]  No    RESPIRATORY:             Cough [  ]  Yes  [ x ]  No                                  Wheezing [  ]  Yes  x[  ]  No                                Hemoptysis [  ]  Yes  [ x ]  No                                      Sputum [  ]  Yes  [ x ]  No                   Shortness of Breathe [  ]  Yes  [ x ]  No    GASTROINTESTINAL:      Abdominal pain  [  ]  Yes  [ x]  No                                                    Nausea  [  ]  Yes  [x  ]  No                                                   Vomiting [  ]  Yes  [ x ]  No                                              Constipation [  ]  Yes  [ x ]  No                                                    Diarrhea [  ]  Yes  [x  ]  No,     GENITOURINARY:           Incontinence [ x ]  Yes  [  ]  No                                                Dysuria [  ]  Yes  [x  ]  No                                      Blood in Urine  [  ]  Yes  [xx  ]  No                          Frequency or urgency.  [  ]  Yes  [  x]  No    NEUROLOGIC:                     Paresthesias  [  ]  Yes  [ x ]  No                                             fasciculations  [  ]  Yes  [ x ]  No                                seizures or weakness.  [  ]  Yes  [  x]  No                                                   Headache [  ]  Yes  [ x ]  No                                  Loss of Conciousness [  ]  Yes  [  x]  No                                                     Insomnia [  ]  Yes  [  x]  No    PSYCHIATRIC:    Disorder of thought or mood.  [  x]  Yes  [  ]  No                                                           Anxiety [  ]  Yes  [ x ]  No                                                      Depression [  x]  Yes  [  ]  No                                                         Dementia [  x]  Yes  [   No    .Vital Signs Last 24 Hrs  T(C): 36.4 (10 Apr 2018 05:35), Max: 36.4 (09 Apr 2018 20:43)  T(F): 97.5 (10 Apr 2018 05:35), Max: 97.5 (09 Apr 2018 20:43)  HR: 81 (10 Apr 2018 05:35) (81 - 108)  BP: 125/67 (10 Apr 2018 05:35) (104/63 - 141/78)  BP(mean): --  RR: 17 (10 Apr 2018 05:35) (17 - 17)  SpO2: 96% (10 Apr 2018 05:35) (96% - 98%)  PHYSICAL EXAMINATION:    GENERAL: The patient is  _____in no apparent distress.     HEENT: Head is normocephalic and atraumatic. Extraocular muscles are intact. Mucous membranes are moist.     NECK: Supple. jvd [  ]  Yes  [ x ]  No    LUNGS: Clear to auscultation  [ x ]  Yes  [  ]  No                               wheezing, [  ]  Yes  [ x ]  No                                      rales  [  ]  Yes  [ x ]  No                                    rhonchi  [  ]  Yes  [  x]  No                 Respirations labored  [  ]  Yes  x[  ]  No    HEART: Regular rate and rhythm  [  x]  Yes  [  ]  No                                        Murmur [  ]  Yes  [ x ]  No                                              rub  [  ]  Yes  [ x x  No                                          gallop  [  ]  Yes  [xx  ]  No    ABDOMEN:                                 Soft, [xx  ]  Yes  [  ]  No                                             nontender [x  ]  Yes  [  ]  No                                             distended.[  ]  Yes  [  x]  No                            hepatosplenomegaly ,[  ]  Yes  [ x ]  No                                                    BS   [x  ]  Yes  [  ]  No    EXTREMITIES:                cyanosis, [  ]  Yes  [ x ]  No                                       clubbing,   [  ]  Yes  [x  ]  No                                               rash, [  ]  Yes  [ x ]  No                                           edema.  [ x ]  Yes  [  ]  No    NEUROLOGIC: Alert and Oriented  [x  ] Person [  ] Time  [ ] Place                                    Cranial nerves intact    [  ]  Yes  [  ]  No                                               sensory Intact  [  ]  Yes  [  ]  No                                                  motor WNL   [  ]  Yes  [  ]  No                                                Tony Paresis  [  ]  Yes  [  ]  No    LABS:  DTR  babinski+ or -.  < from: Transthoracic Echocardiogram (04.05.18 @ 07:31) >  CONCLUSIONS:  1. Bioprosthetic mitral valve replacement. Mean transmitral  valve gradient equals 7.5 mm Hg (at a HR of 94 BPM) which  is elevated in the setting of a bioprosthetic mitral valve.  2. Normal left ventricular internal dimensions and wall  thicknesses.  3. Endocardium not well visualized; grossly normal left  ventricular systolic function. Segmental wall motion could  not be assessed.  4. Right ventricle not well visualized. A device lead is  visualized in the right heart.    ------------------------------------------------------------------------  Confirmed on  4/5/2018 - 14:35:02 by Braulio Perera MD    < end of copied text >    LABS:                        10.9   6.6   )-----------( 165      ( 07 Apr 2018 07:14 )             34.9     04-07    141  |  110<H>  |  23<H>  ----------------------------<  98  4.0   |  25  |  1.04    Ca    9.1      07 Apr 2018 07:14  Phos  2.0     04-06  Mg     2.3     04-07      PT/INR - ( 06 Apr 2018 05:54 )   PT: 12.0 sec;   INR: 1.10 ratio                     Serum Pro-Brain Natriuretic Peptide: 1201 pg/mL (04-05-18 @ 06:18)      Procalcitonin, Serum: 0.28 ng/mL (04-05-18 @ 12:37)          LABS:                        11.3   6.6   )-----------( 169      ( 06 Apr 2018 05:54 )             35.3     04-06    139  |  107  |  32<H>  ----------------------------<  92  3.6   |  26  |  1.14    Ca    9.2      06 Apr 2018 05:54  Phos  2.0     04-06  Mg     2.3     04-06    TPro  8.1  /  Alb  3.9  /  TBili  0.7  /  DBili  x   /  AST  32  /  ALT  22  /  AlkPhos  132<H>  04-04    PT/INR - ( 06 Apr 2018 05:54 )   PT: 12.0 sec;   INR: 1.10 ratio         PTT - ( 04 Apr 2018 16:17 )  PTT:39.3 sec      CARDIAC MARKERS ( 05 Apr 2018 06:18 )  0.033 ng/mL / x     / 60 U/L / x     / 2.3 ng/mL  CARDIAC MARKERS ( 04 Apr 2018 16:17 )  0.040 ng/mL / x     / x     / x     / 3.4 ng/mL        Serum Pro-Brain Natriuretic Peptide: 1201 pg/mL (04-05-18 @ 06:18)      Procalcitonin, Serum: 0.28 ng/mL (04-05-18 @ 12:37)      MICROBIOLOGY:    RADIOLOGY & ADDITIONAL STUDIES:< from: US Duplex Venous Lower Ext Complete, Bilateral (04.05.18 @ 12:11) >  Impression: Noevidence for deep venous thrombosis within the bilateral   common femoral venous, superficial femoral venous or popliteal venous   segments.        < end of copied text >  < from: Xray Ankle Complete 3 Views, Right (04.05.18 @ 07:56) >  IMPRESSION: Ankle swelling. Knee degeneration. Osteoporosis. No acute   bony finding.        < end of copied text >      CXR:    Ct scan chest:    ekg;    echo:

## 2018-04-10 NOTE — PROGRESS NOTE ADULT - PROBLEM SELECTOR PLAN 1
- c/w ciprofloxacin   IVPB 200 mg every 12 hours  - c/w metronidazole   IVPB 500 mg  every 8 hours  - c/w IV fluids  - mechanical soft diet
iv abx   gi eval

## 2018-04-10 NOTE — PROGRESS NOTE ADULT - PROBLEM SELECTOR PLAN 2
resolved with IV hydratin, creatinine is 1.14  -c/w IV fluids
stable  gentle hydration

## 2018-04-10 NOTE — PROGRESS NOTE ADULT - PROBLEM SELECTOR PROBLEM 3
CHF (congestive heart failure)
HTN (hypertension)

## 2018-04-10 NOTE — PROGRESS NOTE ADULT - PROBLEM SELECTOR PLAN 4
- c/w Metoprolol 50 q 12 hrs   - monitor blood pressure
aldactyone   ck echo
aldactyone   ck echo
[email or web address]

## 2018-04-10 NOTE — PROGRESS NOTE ADULT - ATTENDING COMMENTS
patient is clinically stable   pt ot / in rehab - PT consult noted   dc planning with po abx   gi consult appreciated

## 2018-04-10 NOTE — PROGRESS NOTE ADULT - PROBLEM SELECTOR PROBLEM 2
KOKO (acute kidney injury)

## 2018-10-07 ENCOUNTER — EMERGENCY (EMERGENCY)
Facility: HOSPITAL | Age: 83
LOS: 1 days | Discharge: HOME | End: 2018-10-07
Attending: EMERGENCY MEDICINE | Admitting: EMERGENCY MEDICINE
Payer: MEDICARE

## 2018-10-07 VITALS
OXYGEN SATURATION: 100 % | HEART RATE: 86 BPM | SYSTOLIC BLOOD PRESSURE: 152 MMHG | RESPIRATION RATE: 16 BRPM | DIASTOLIC BLOOD PRESSURE: 70 MMHG | TEMPERATURE: 97 F

## 2018-10-07 VITALS
OXYGEN SATURATION: 99 % | RESPIRATION RATE: 16 BRPM | SYSTOLIC BLOOD PRESSURE: 156 MMHG | DIASTOLIC BLOOD PRESSURE: 69 MMHG | HEART RATE: 90 BPM

## 2018-10-07 LAB
ALBUMIN SERPL ELPH-MCNC: 3.8 G/DL — SIGNIFICANT CHANGE UP (ref 3.3–5)
ALP SERPL-CCNC: 107 U/L — SIGNIFICANT CHANGE UP (ref 40–120)
ALT FLD-CCNC: 14 U/L — SIGNIFICANT CHANGE UP (ref 4–33)
APPEARANCE UR: SIGNIFICANT CHANGE UP
APTT BLD: 35.9 SEC — SIGNIFICANT CHANGE UP (ref 27.5–37.4)
AST SERPL-CCNC: 29 U/L — SIGNIFICANT CHANGE UP (ref 4–32)
BACTERIA # UR AUTO: SIGNIFICANT CHANGE UP
BASE EXCESS BLDV CALC-SCNC: 2.8 MMOL/L — SIGNIFICANT CHANGE UP
BASOPHILS # BLD AUTO: 0.07 K/UL — SIGNIFICANT CHANGE UP (ref 0–0.2)
BASOPHILS NFR BLD AUTO: 1 % — SIGNIFICANT CHANGE UP (ref 0–2)
BILIRUB SERPL-MCNC: 0.7 MG/DL — SIGNIFICANT CHANGE UP (ref 0.2–1.2)
BILIRUB UR-MCNC: NEGATIVE — SIGNIFICANT CHANGE UP
BLOOD GAS VENOUS - CREATININE: 1.1 MG/DL — SIGNIFICANT CHANGE UP (ref 0.5–1.3)
BLOOD UR QL VISUAL: NEGATIVE — SIGNIFICANT CHANGE UP
BUN SERPL-MCNC: 28 MG/DL — HIGH (ref 7–23)
CALCIUM SERPL-MCNC: 9.8 MG/DL — SIGNIFICANT CHANGE UP (ref 8.4–10.5)
CHLORIDE BLDV-SCNC: 105 MMOL/L — SIGNIFICANT CHANGE UP (ref 96–108)
CHLORIDE SERPL-SCNC: 99 MMOL/L — SIGNIFICANT CHANGE UP (ref 98–107)
CO2 SERPL-SCNC: 22 MMOL/L — SIGNIFICANT CHANGE UP (ref 22–31)
COLOR SPEC: YELLOW — SIGNIFICANT CHANGE UP
CREAT SERPL-MCNC: 1.03 MG/DL — SIGNIFICANT CHANGE UP (ref 0.5–1.3)
EOSINOPHIL # BLD AUTO: 0.14 K/UL — SIGNIFICANT CHANGE UP (ref 0–0.5)
EOSINOPHIL NFR BLD AUTO: 1.9 % — SIGNIFICANT CHANGE UP (ref 0–6)
GAS PNL BLDV: 133 MMOL/L — LOW (ref 136–146)
GLUCOSE BLDV-MCNC: 102 — HIGH (ref 70–99)
GLUCOSE SERPL-MCNC: 97 MG/DL — SIGNIFICANT CHANGE UP (ref 70–99)
GLUCOSE UR-MCNC: NEGATIVE — SIGNIFICANT CHANGE UP
HCO3 BLDV-SCNC: 27 MMOL/L — SIGNIFICANT CHANGE UP (ref 20–27)
HCT VFR BLD CALC: 38.1 % — SIGNIFICANT CHANGE UP (ref 34.5–45)
HCT VFR BLDV CALC: 37.5 % — SIGNIFICANT CHANGE UP (ref 34.5–45)
HGB BLD-MCNC: 12.3 G/DL — SIGNIFICANT CHANGE UP (ref 11.5–15.5)
HGB BLDV-MCNC: 12.2 G/DL — SIGNIFICANT CHANGE UP (ref 11.5–15.5)
IMM GRANULOCYTES # BLD AUTO: 0.04 # — SIGNIFICANT CHANGE UP
IMM GRANULOCYTES NFR BLD AUTO: 0.5 % — SIGNIFICANT CHANGE UP (ref 0–1.5)
INR BLD: 0.98 — SIGNIFICANT CHANGE UP (ref 0.88–1.17)
KETONES UR-MCNC: NEGATIVE — SIGNIFICANT CHANGE UP
LACTATE BLDV-MCNC: 1.4 MMOL/L — SIGNIFICANT CHANGE UP (ref 0.5–2)
LEUKOCYTE ESTERASE UR-ACNC: HIGH
LIDOCAIN IGE QN: 38 U/L — SIGNIFICANT CHANGE UP (ref 7–60)
LYMPHOCYTES # BLD AUTO: 2.15 K/UL — SIGNIFICANT CHANGE UP (ref 1–3.3)
LYMPHOCYTES # BLD AUTO: 29.5 % — SIGNIFICANT CHANGE UP (ref 13–44)
MCHC RBC-ENTMCNC: 28.2 PG — SIGNIFICANT CHANGE UP (ref 27–34)
MCHC RBC-ENTMCNC: 32.3 % — SIGNIFICANT CHANGE UP (ref 32–36)
MCV RBC AUTO: 87.4 FL — SIGNIFICANT CHANGE UP (ref 80–100)
MONOCYTES # BLD AUTO: 0.59 K/UL — SIGNIFICANT CHANGE UP (ref 0–0.9)
MONOCYTES NFR BLD AUTO: 8.1 % — SIGNIFICANT CHANGE UP (ref 2–14)
NEUTROPHILS # BLD AUTO: 4.29 K/UL — SIGNIFICANT CHANGE UP (ref 1.8–7.4)
NEUTROPHILS NFR BLD AUTO: 59 % — SIGNIFICANT CHANGE UP (ref 43–77)
NITRITE UR-MCNC: NEGATIVE — SIGNIFICANT CHANGE UP
NRBC # FLD: 0.04 — SIGNIFICANT CHANGE UP
PCO2 BLDV: 39 MMHG — LOW (ref 41–51)
PH BLDV: 7.45 PH — HIGH (ref 7.32–7.43)
PH UR: 8.5 — HIGH (ref 5–8)
PLATELET # BLD AUTO: 242 K/UL — SIGNIFICANT CHANGE UP (ref 150–400)
PMV BLD: 9.8 FL — SIGNIFICANT CHANGE UP (ref 7–13)
PO2 BLDV: 51 MMHG — HIGH (ref 35–40)
POTASSIUM BLDV-SCNC: 4.3 MMOL/L — SIGNIFICANT CHANGE UP (ref 3.4–4.5)
POTASSIUM SERPL-MCNC: 4.7 MMOL/L — SIGNIFICANT CHANGE UP (ref 3.5–5.3)
POTASSIUM SERPL-SCNC: 4.7 MMOL/L — SIGNIFICANT CHANGE UP (ref 3.5–5.3)
PROT SERPL-MCNC: 7.2 G/DL — SIGNIFICANT CHANGE UP (ref 6–8.3)
PROT UR-MCNC: 70 — SIGNIFICANT CHANGE UP
PROTHROM AB SERPL-ACNC: 10.9 SEC — SIGNIFICANT CHANGE UP (ref 9.8–13.1)
RBC # BLD: 4.36 M/UL — SIGNIFICANT CHANGE UP (ref 3.8–5.2)
RBC # FLD: 12.8 % — SIGNIFICANT CHANGE UP (ref 10.3–14.5)
RBC CASTS # UR COMP ASSIST: SIGNIFICANT CHANGE UP (ref 0–?)
SAO2 % BLDV: 84.2 % — SIGNIFICANT CHANGE UP (ref 60–85)
SODIUM SERPL-SCNC: 133 MMOL/L — LOW (ref 135–145)
SP GR SPEC: 1.02 — SIGNIFICANT CHANGE UP (ref 1–1.04)
TRI-PHOS CRY UR QL COMP ASSIST: SIGNIFICANT CHANGE UP
UROBILINOGEN FLD QL: NORMAL — SIGNIFICANT CHANGE UP
WBC # BLD: 7.28 K/UL — SIGNIFICANT CHANGE UP (ref 3.8–10.5)
WBC # FLD AUTO: 7.28 K/UL — SIGNIFICANT CHANGE UP (ref 3.8–10.5)
WBC UR QL: >50 — HIGH (ref 0–?)

## 2018-10-07 PROCEDURE — 99283 EMERGENCY DEPT VISIT LOW MDM: CPT | Mod: GC

## 2018-10-07 PROCEDURE — 74177 CT ABD & PELVIS W/CONTRAST: CPT | Mod: 26

## 2018-10-07 RX ORDER — DIPHENHYDRAMINE HCL 50 MG
25 CAPSULE ORAL ONCE
Qty: 0 | Refills: 0 | Status: COMPLETED | OUTPATIENT
Start: 2018-10-07 | End: 2018-10-07

## 2018-10-07 RX ORDER — AZTREONAM 2 G
1 VIAL (EA) INJECTION
Qty: 6 | Refills: 0 | OUTPATIENT
Start: 2018-10-07 | End: 2018-10-09

## 2018-10-07 RX ORDER — AZTREONAM 2 G
1 VIAL (EA) INJECTION
Qty: 10 | Refills: 0 | OUTPATIENT
Start: 2018-10-07 | End: 2018-10-11

## 2018-10-07 RX ADMIN — Medication 0.5 MILLIGRAM(S): at 20:46

## 2018-10-07 RX ADMIN — Medication 25 MILLIGRAM(S): at 19:15

## 2018-10-07 RX ADMIN — Medication 1 TABLET(S): at 22:48

## 2018-10-07 NOTE — ED PROVIDER NOTE - OBJECTIVE STATEMENT
89F PMH HTN, COPD, dementia, CHF, chronic ventral hernias and depression, admission for colitis 4/18 presenting from nursing [facility] 89F PMH HTN, COPD, dementia, CHF, chronic ventral hernias and depression, admission for colitis 4/18 presenting from nursing home with complaints of LLQ abd pain as per chart. Pt poor historian, overall elicits sacral pain r>l, and skin tenderness diffusely. + dysuria. Pt denies trauma to the area. Denies HA, dizziness, chest pain, shortness of breath, changes in bowel habits.

## 2018-10-07 NOTE — ED PROVIDER NOTE - ATTENDING CONTRIBUTION TO CARE
Dr. Velez: I have personally performed a face to face bedside history and physical examination of this patient. I have discussed the history, examination, review of systems, assessment and plan of management with the resident. I have reviewed the electronic medical record and amended it to reflect my history, review of systems, physical exam, assessment and plan.    89F with pmh of CHF, HTN, COPD, ventral hernia, SBO, s/p appendectomy, ex lap coming  from NH sent in for abdominal pain. (LLQ). Pt is unreliable historian 2/2 dementia but reports pain on the R lower back and also erythema to R leg. No f/c, n/v.    On exam appears uncomfortable Dr. Velez: I have personally performed a face to face bedside history and physical examination of this patient. I have discussed the history, examination, review of systems, assessment and plan of management with the resident. I have reviewed the electronic medical record and amended it to reflect my history, review of systems, physical exam, assessment and plan.    89F with pmh of CHF, HTN, COPD, ventral hernia, SBO, s/p appendectomy, ex lap coming  from NH sent in for abdominal pain. (LLQ). Pt is unreliable historian 2/2 dementia but reports erythema to R leg. No f/c, n/v.    On exam alert, nontoxic  MMM  neck supple  abd soft, minimal lower abd tenderness, no rebound  no CVA tenderness   FROM bilateral hips and kness  mild erythema and warmth to right shin  equal DP and radial pulses    will r/o partial SBO, diverticulitis, cystitis. possibly leg cellulitis. plan for labs, CT a/p, abx.

## 2018-10-07 NOTE — ED PROVIDER NOTE - MEDICAL DECISION MAKING DETAILS
89F PMH HTN, COPD, dementia, CHF, chronic ventral hernias and depression, admission for colitis 4/18 presenting from nursing home with complaints of LLQ abd. Will obtain CT abd/pelvis for eval of intrabd path, bleed and/or fracture. CBC, CMP. UA/UC

## 2018-10-07 NOTE — ED ADULT NURSE NOTE - OBJECTIVE STATEMENT
[facility], pt is currently awake/alert Aox3 at this time but poor historian. C/o lower abd pian x 2-3 days that has been "coming and going for a while now." Unable to quantify pain at this time. Denies N/V/D. Last BM was today. States "When I pee it is hot." Denies fever. Awaiting MD kirby., Report given to primary RN.

## 2018-10-07 NOTE — ED PROVIDER NOTE - PROGRESS NOTE DETAILS
CT w/o acute pathology. UA with UTI. Concern for possible rash / pre-cellulitis on leg. Will cover with bactrim. Will d/c to nursing home with strict return instructions and need for primary care follow up. Patient resting in bed, no acute distress, no agitation. No abd tenderness to palpation on exam.  Sammy Andre MD, PGY2 Emergency Medicine

## 2018-10-07 NOTE — ED ADULT NURSE NOTE - NSIMPLEMENTINTERV_GEN_ALL_ED
Implemented All Fall with Harm Risk Interventions:  [place] to call system. Call bell, personal items and telephone within reach. Instruct patient to call for assistance. Room bathroom lighting operational. Non-slip footwear when patient is off stretcher. Physically safe environment: no spills, clutter or unnecessary equipment. Stretcher in lowest position, wheels locked, appropriate side rails in place. Provide visual cue, wrist band, yellow gown, etc. Monitor gait and stability. Monitor for mental status changes and reorient to person, place, and time. Review medications for side effects contributing to fall risk. Reinforce activity limits and safety measures with patient and family. Provide visual clues: red socks.

## 2018-10-08 ENCOUNTER — INPATIENT (INPATIENT)
Facility: HOSPITAL | Age: 83
LOS: 2 days | Discharge: NURSING, REHAB OR LONG-TERM CARE | End: 2018-10-11
Attending: HOSPITALIST | Admitting: HOSPITALIST
Payer: MEDICARE

## 2018-10-08 VITALS
RESPIRATION RATE: 18 BRPM | OXYGEN SATURATION: 96 % | DIASTOLIC BLOOD PRESSURE: 64 MMHG | SYSTOLIC BLOOD PRESSURE: 140 MMHG | TEMPERATURE: 98 F | HEART RATE: 89 BPM

## 2018-10-08 DIAGNOSIS — Z29.9 ENCOUNTER FOR PROPHYLACTIC MEASURES, UNSPECIFIED: ICD-10-CM

## 2018-10-08 DIAGNOSIS — I50.32 CHRONIC DIASTOLIC (CONGESTIVE) HEART FAILURE: ICD-10-CM
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
## 2018-10-08 DIAGNOSIS — M15.9 POLYOSTEOARTHRITIS, UNSPECIFIED: ICD-10-CM

## 2018-10-08 DIAGNOSIS — N30.00 ACUTE CYSTITIS WITHOUT HEMATURIA: ICD-10-CM

## 2018-10-08 LAB
ALBUMIN SERPL ELPH-MCNC: 3.9 G/DL — SIGNIFICANT CHANGE UP (ref 3.3–5)
ALP SERPL-CCNC: 117 U/L — SIGNIFICANT CHANGE UP (ref 40–120)
ALT FLD-CCNC: 21 U/L — SIGNIFICANT CHANGE UP (ref 4–33)
AST SERPL-CCNC: 62 U/L — HIGH (ref 4–32)
BASOPHILS # BLD AUTO: 0.05 K/UL — SIGNIFICANT CHANGE UP (ref 0–0.2)
BASOPHILS NFR BLD AUTO: 0.5 % — SIGNIFICANT CHANGE UP (ref 0–2)
BILIRUB SERPL-MCNC: 0.7 MG/DL — SIGNIFICANT CHANGE UP (ref 0.2–1.2)
BUN SERPL-MCNC: 24 MG/DL — HIGH (ref 7–23)
CALCIUM SERPL-MCNC: 10.4 MG/DL — SIGNIFICANT CHANGE UP (ref 8.4–10.5)
CHLORIDE SERPL-SCNC: 101 MMOL/L — SIGNIFICANT CHANGE UP (ref 98–107)
CO2 SERPL-SCNC: 21 MMOL/L — LOW (ref 22–31)
CREAT SERPL-MCNC: 1.17 MG/DL — SIGNIFICANT CHANGE UP (ref 0.5–1.3)
EOSINOPHIL # BLD AUTO: 0.07 K/UL — SIGNIFICANT CHANGE UP (ref 0–0.5)
EOSINOPHIL NFR BLD AUTO: 0.7 % — SIGNIFICANT CHANGE UP (ref 0–6)
GLUCOSE SERPL-MCNC: 89 MG/DL — SIGNIFICANT CHANGE UP (ref 70–99)
HCT VFR BLD CALC: 41.5 % — SIGNIFICANT CHANGE UP (ref 34.5–45)
HGB BLD-MCNC: 13.4 G/DL — SIGNIFICANT CHANGE UP (ref 11.5–15.5)
IMM GRANULOCYTES # BLD AUTO: 0.04 # — SIGNIFICANT CHANGE UP
IMM GRANULOCYTES NFR BLD AUTO: 0.4 % — SIGNIFICANT CHANGE UP (ref 0–1.5)
LYMPHOCYTES # BLD AUTO: 1.4 K/UL — SIGNIFICANT CHANGE UP (ref 1–3.3)
LYMPHOCYTES # BLD AUTO: 14.9 % — SIGNIFICANT CHANGE UP (ref 13–44)
MCHC RBC-ENTMCNC: 28.6 PG — SIGNIFICANT CHANGE UP (ref 27–34)
MCHC RBC-ENTMCNC: 32.3 % — SIGNIFICANT CHANGE UP (ref 32–36)
MCV RBC AUTO: 88.7 FL — SIGNIFICANT CHANGE UP (ref 80–100)
MONOCYTES # BLD AUTO: 0.68 K/UL — SIGNIFICANT CHANGE UP (ref 0–0.9)
MONOCYTES NFR BLD AUTO: 7.2 % — SIGNIFICANT CHANGE UP (ref 2–14)
NEUTROPHILS # BLD AUTO: 7.17 K/UL — SIGNIFICANT CHANGE UP (ref 1.8–7.4)
NEUTROPHILS NFR BLD AUTO: 76.3 % — SIGNIFICANT CHANGE UP (ref 43–77)
NRBC # FLD: 0 — SIGNIFICANT CHANGE UP
PLATELET # BLD AUTO: 274 K/UL — SIGNIFICANT CHANGE UP (ref 150–400)
PMV BLD: 10.1 FL — SIGNIFICANT CHANGE UP (ref 7–13)
POTASSIUM SERPL-MCNC: 5.7 MMOL/L — HIGH (ref 3.5–5.3)
POTASSIUM SERPL-SCNC: 5.7 MMOL/L — HIGH (ref 3.5–5.3)
PROT SERPL-MCNC: 8 G/DL — SIGNIFICANT CHANGE UP (ref 6–8.3)
RBC # BLD: 4.68 M/UL — SIGNIFICANT CHANGE UP (ref 3.8–5.2)
RBC # FLD: 13.2 % — SIGNIFICANT CHANGE UP (ref 10.3–14.5)
SODIUM SERPL-SCNC: 136 MMOL/L — SIGNIFICANT CHANGE UP (ref 135–145)
WBC # BLD: 9.41 K/UL — SIGNIFICANT CHANGE UP (ref 3.8–10.5)
WBC # FLD AUTO: 9.41 K/UL — SIGNIFICANT CHANGE UP (ref 3.8–10.5)

## 2018-10-08 PROCEDURE — 99223 1ST HOSP IP/OBS HIGH 75: CPT

## 2018-10-08 PROCEDURE — 70450 CT HEAD/BRAIN W/O DYE: CPT | Mod: 26

## 2018-10-08 PROCEDURE — 73562 X-RAY EXAM OF KNEE 3: CPT | Mod: 26,50

## 2018-10-08 PROCEDURE — 73620 X-RAY EXAM OF FOOT: CPT | Mod: 26,50

## 2018-10-08 PROCEDURE — 73521 X-RAY EXAM HIPS BI 2 VIEWS: CPT | Mod: 26

## 2018-10-08 PROCEDURE — 71045 X-RAY EXAM CHEST 1 VIEW: CPT | Mod: 26

## 2018-10-08 RX ORDER — MIRTAZAPINE 45 MG/1
15 TABLET, ORALLY DISINTEGRATING ORAL AT BEDTIME
Qty: 0 | Refills: 0 | Status: DISCONTINUED | OUTPATIENT
Start: 2018-10-08 | End: 2018-10-11

## 2018-10-08 RX ORDER — HALOPERIDOL DECANOATE 100 MG/ML
5 INJECTION INTRAMUSCULAR ONCE
Qty: 0 | Refills: 0 | Status: DISCONTINUED | OUTPATIENT
Start: 2018-10-08 | End: 2018-10-09

## 2018-10-08 RX ORDER — LORATADINE 10 MG/1
1 TABLET ORAL
Qty: 0 | Refills: 0 | COMMUNITY

## 2018-10-08 RX ORDER — SENNA PLUS 8.6 MG/1
2 TABLET ORAL AT BEDTIME
Qty: 0 | Refills: 0 | Status: DISCONTINUED | OUTPATIENT
Start: 2018-10-08 | End: 2018-10-11

## 2018-10-08 RX ORDER — LORATADINE 10 MG/1
10 TABLET ORAL DAILY
Qty: 0 | Refills: 0 | Status: DISCONTINUED | OUTPATIENT
Start: 2018-10-08 | End: 2018-10-11

## 2018-10-08 RX ORDER — MIRTAZAPINE 45 MG/1
15 TABLET, ORALLY DISINTEGRATING ORAL
Qty: 0 | Refills: 0 | COMMUNITY

## 2018-10-08 RX ORDER — CLOPIDOGREL BISULFATE 75 MG/1
75 TABLET, FILM COATED ORAL DAILY
Qty: 0 | Refills: 0 | Status: DISCONTINUED | OUTPATIENT
Start: 2018-10-08 | End: 2018-10-11

## 2018-10-08 RX ORDER — CLOPIDOGREL BISULFATE 75 MG/1
1 TABLET, FILM COATED ORAL
Qty: 0 | Refills: 0 | COMMUNITY

## 2018-10-08 RX ORDER — ACETAMINOPHEN 500 MG
975 TABLET ORAL ONCE
Qty: 0 | Refills: 0 | Status: COMPLETED | OUTPATIENT
Start: 2018-10-08 | End: 2018-10-08

## 2018-10-08 RX ORDER — METOPROLOL TARTRATE 50 MG
1 TABLET ORAL
Qty: 0 | Refills: 0 | COMMUNITY

## 2018-10-08 RX ORDER — CEFTRIAXONE 500 MG/1
1 INJECTION, POWDER, FOR SOLUTION INTRAMUSCULAR; INTRAVENOUS ONCE
Qty: 0 | Refills: 0 | Status: COMPLETED | OUTPATIENT
Start: 2018-10-08 | End: 2018-10-08

## 2018-10-08 RX ORDER — FAMOTIDINE 10 MG/ML
1 INJECTION INTRAVENOUS
Qty: 0 | Refills: 0 | COMMUNITY

## 2018-10-08 RX ORDER — ASCORBIC ACID 60 MG
500 TABLET,CHEWABLE ORAL DAILY
Qty: 0 | Refills: 0 | Status: DISCONTINUED | OUTPATIENT
Start: 2018-10-08 | End: 2018-10-11

## 2018-10-08 RX ORDER — ATORVASTATIN CALCIUM 80 MG/1
10 TABLET, FILM COATED ORAL AT BEDTIME
Qty: 0 | Refills: 0 | Status: DISCONTINUED | OUTPATIENT
Start: 2018-10-08 | End: 2018-10-11

## 2018-10-08 RX ORDER — DOCUSATE SODIUM 100 MG
1 CAPSULE ORAL
Qty: 0 | Refills: 0 | COMMUNITY

## 2018-10-08 RX ORDER — SPIRONOLACTONE 25 MG/1
25 TABLET, FILM COATED ORAL
Qty: 0 | Refills: 0 | COMMUNITY

## 2018-10-08 RX ORDER — ASCORBIC ACID 60 MG
1 TABLET,CHEWABLE ORAL
Qty: 0 | Refills: 0 | COMMUNITY

## 2018-10-08 RX ORDER — ACETAMINOPHEN 500 MG
650 TABLET ORAL EVERY 6 HOURS
Qty: 0 | Refills: 0 | Status: DISCONTINUED | OUTPATIENT
Start: 2018-10-08 | End: 2018-10-09

## 2018-10-08 RX ORDER — LOVASTATIN 20 MG
1 TABLET ORAL
Qty: 0 | Refills: 0 | COMMUNITY

## 2018-10-08 RX ORDER — DOCUSATE SODIUM 100 MG
100 CAPSULE ORAL THREE TIMES A DAY
Qty: 0 | Refills: 0 | Status: DISCONTINUED | OUTPATIENT
Start: 2018-10-08 | End: 2018-10-11

## 2018-10-08 RX ORDER — HEPARIN SODIUM 5000 [USP'U]/ML
5000 INJECTION INTRAVENOUS; SUBCUTANEOUS EVERY 12 HOURS
Qty: 0 | Refills: 0 | Status: DISCONTINUED | OUTPATIENT
Start: 2018-10-08 | End: 2018-10-11

## 2018-10-08 RX ORDER — FAMOTIDINE 10 MG/ML
20 INJECTION INTRAVENOUS DAILY
Qty: 0 | Refills: 0 | Status: DISCONTINUED | OUTPATIENT
Start: 2018-10-08 | End: 2018-10-11

## 2018-10-08 RX ORDER — METOPROLOL TARTRATE 50 MG
25 TABLET ORAL
Qty: 0 | Refills: 0 | Status: DISCONTINUED | OUTPATIENT
Start: 2018-10-08 | End: 2018-10-11

## 2018-10-08 RX ORDER — LANOLIN ALCOHOL/MO/W.PET/CERES
1 CREAM (GRAM) TOPICAL
Qty: 0 | Refills: 0 | COMMUNITY

## 2018-10-08 RX ORDER — LANOLIN ALCOHOL/MO/W.PET/CERES
3 CREAM (GRAM) TOPICAL AT BEDTIME
Qty: 0 | Refills: 0 | Status: DISCONTINUED | OUTPATIENT
Start: 2018-10-08 | End: 2018-10-11

## 2018-10-08 RX ORDER — FUROSEMIDE 40 MG
80 TABLET ORAL DAILY
Qty: 0 | Refills: 0 | Status: DISCONTINUED | OUTPATIENT
Start: 2018-10-08 | End: 2018-10-09

## 2018-10-08 RX ORDER — OMEPRAZOLE 10 MG/1
1 CAPSULE, DELAYED RELEASE ORAL
Qty: 0 | Refills: 0 | COMMUNITY

## 2018-10-08 RX ADMIN — MIRTAZAPINE 15 MILLIGRAM(S): 45 TABLET, ORALLY DISINTEGRATING ORAL at 22:36

## 2018-10-08 RX ADMIN — Medication 975 MILLIGRAM(S): at 13:00

## 2018-10-08 RX ADMIN — CEFTRIAXONE 1 GRAM(S): 500 INJECTION, POWDER, FOR SOLUTION INTRAMUSCULAR; INTRAVENOUS at 19:38

## 2018-10-08 RX ADMIN — ATORVASTATIN CALCIUM 10 MILLIGRAM(S): 80 TABLET, FILM COATED ORAL at 22:36

## 2018-10-08 RX ADMIN — CEFTRIAXONE 100 GRAM(S): 500 INJECTION, POWDER, FOR SOLUTION INTRAMUSCULAR; INTRAVENOUS at 16:09

## 2018-10-08 RX ADMIN — Medication 975 MILLIGRAM(S): at 16:09

## 2018-10-08 RX ADMIN — SENNA PLUS 2 TABLET(S): 8.6 TABLET ORAL at 22:37

## 2018-10-08 NOTE — H&P ADULT - NSHPLABSRESULTS_GEN_ALL_CORE
10-08    136  |  101  |  24<H>  ----------------------------<  89  5.7<H>   |  21<L>  |  1.17    Ca    10.4      08 Oct 2018 14:19    TPro  8.0  /  Alb  3.9  /  TBili  0.7  /  DBili  x   /  AST  62<H>  /  ALT  21  /  AlkPhos  117  10-08                            13.4   9.41  )-----------( 274      ( 08 Oct 2018 14:19 )             41.5             PT/INR - ( 07 Oct 2018 20:30 )   PT: 10.9 SEC;   INR: 0.98          PTT - ( 07 Oct 2018 20:30 )  PTT:35.9 SEC    Urinalysis Basic - ( 07 Oct 2018 17:58 )    Color: YELLOW / Appearance: HAZY / S.017 / pH: 8.5  Gluc: NEGATIVE / Ketone: NEGATIVE  / Bili: NEGATIVE / Urobili: NORMAL   Blood: NEGATIVE / Protein: 70 / Nitrite: NEGATIVE   Leuk Esterase: LARGE / RBC: 5-10 / WBC >50   Sq Epi: x / Non Sq Epi: x / Bacteria: FEW    xray hip and knees reviewed showing signs of osteoarthritis and chronic chondrocalcinosis.

## 2018-10-08 NOTE — H&P ADULT - HISTORY OF PRESENT ILLNESS
89 y/o F with HTN, PPM, diastolic CHF, depression and chronic ventral hernias presents from [facility] with multiple falls.  Pt had presented to ED yesterday with c/o abdominal pain with workup showing positive UA.  She was discharged back to her assisted living with a course of Bactrim.  Pt fell today while in bathroom, landing on knees.  She has had another prior fall this week as well.  Pt reports that she fell twice in the past 2 days but has not fallen prior to this.  Her only complaints at this time is pain in R hip, knee, and ankle that she states is chronic, and preceded these two falls.    In the ED, pt was given ceftriaxone, and Tylenol.  She became agitated, and was given Haldol and Ativan.

## 2018-10-08 NOTE — H&P ADULT - NSHPREVIEWOFSYSTEMS_GEN_ALL_CORE
Review of Systems:   CONSTITUTIONAL: No fever or chills  EYES: No eye pain, visual disturbances, or discharge  ENMT:  Difficulty hearing  NECK: No pain or stiffness  RESPIRATORY: No cough, No shortness of breath  CARDIOVASCULAR: No chest pain, palpitations, dizziness, or leg swelling  GASTROINTESTINAL: No abdominal pain, nausea or vomiting or diarrhea  GENITOURINARY: No dysuria, or hematuria  NEUROLOGICAL: No headaches, memory loss, loss of strength, numbness, or tremors  SKIN: No rashes, or lesions   LYMPH NODES: No enlarged glands  ENDOCRINE: No heat or cold intolerance  MUSCULOSKELETAL: hip, knee, and leg pain as above  PSYCHIATRIC: No depression or anxiety  HEME/LYMPH: No easy bruising, or bleeding  ALLERGY AND IMMUNOLOGIC: No hives or eczema

## 2018-10-08 NOTE — ED PROVIDER NOTE - PHYSICAL EXAMINATION
GEN: Comfortably laying in bed, in no apparent distress.  HEAD: NCAT  HEENT: PERRL, EOMI, no nystagmus, Airway patent, MMM, neck supple, no LAD, no JVD  LUNG: CTAB, no adventitious sounds, no retractions, no nasal flaring  CV: RRR, no murmurs,   Abd: soft, NTND, no rebound or guarding, BS+ in all quadrants, no CVAT  MSK: Feet are even at end of bed, WWP, Pulses 2+ in extremities, b/L pedal edema to knees, no visible deformities, strength 3/5 in upper extremities, Strength LE limited by pain,  Neuro:  CN II-XII in tact. AAOx2, non-ambulatory sensations in tact in all extremities,   Skin: Warm and dry, small ecchymotic changes at AC likely from frequent needle draws   Psych: normal mood and affect GEN: Comfortably laying in bed, in no apparent distress.  HEAD: NCAT  HEENT: PERRL, EOMI, no nystagmus, Airway patent, MMM, neck supple, no LAD, no JVD  LUNG: CTAB, no adventitious sounds, no retractions, no nasal flaring  CV: RRR, no murmurs, Pacemaker in place, not Tender to palpation  Abd: soft, NTND, no rebound or guarding, BS+ in all quadrants, no CVAT  MSK: Feet are even at end of bed, WWP, Pulses 2+ in extremities, b/L pedal edema to knees, no visible deformities, strength 3/5 in upper extremities, Strength LE limited by pain,  Neuro:  CN II-XII in tact. AAOx2, non-ambulatory sensations in tact in all extremities,   Skin: Warm and dry, small ecchymotic changes at AC likely from frequent needle draws   Psych: normal mood and affect

## 2018-10-08 NOTE — ED PROVIDER NOTE - NS ED ROS FT
Constitutional: no fevers, no chills.  Eyes: no visual changes.  Ears: no ear drainage, no ear pain.  Nose: no nasal congestion.  Mouth/Throat: no sore throat.  Cardiovascular: no chest pain.  Respiratory: no shortness of breath, no wheezing, no cough  Gastrointestinal: no nausea, no vomiting, no diarrhea, no abdominal pain.  MSK: no flank pain, no back pain. +R knee pain   Genitourinary: no dysuria, no hematuria.  Skin: no rashes.  Neuro: no headache, +fall   Psychiatric: no known mental health issues.

## 2018-10-08 NOTE — ED ADULT NURSE NOTE - NSIMPLEMENTINTERV_GEN_ALL_ED
Implemented All Fall with Harm Risk Interventions:  [place] to call system. Call bell, personal items and telephone within reach. Instruct patient to call for assistance. Room bathroom lighting operational. Non-slip footwear when patient is off stretcher. Physically safe environment: no spills, clutter or unnecessary equipment. Stretcher in lowest position, wheels locked, appropriate side rails in place. Provide visual cue, wrist band, yellow gown, etc. Monitor gait and stability. Monitor for mental status changes and reorient to person, place, and time. Review medications for side effects contributing to fall risk. Reinforce activity limits and safety measures with patient and family. Provide visual clues: red socks.

## 2018-10-08 NOTE — ED PROVIDER NOTE - CARE PLAN
Principal Discharge DX:	Fall, initial encounter  Secondary Diagnosis:	Urinary tract infection without hematuria, site unspecified

## 2018-10-08 NOTE — H&P ADULT - ASSESSMENT
89 y/o F with diastolic CHF, HTN, PPM and depression p/w 2 falls in the past 2 days in the setting of UTI.

## 2018-10-08 NOTE — ED PROCEDURE NOTE - PROCEDURE ADDITIONAL DETAILS
Peripheral IV access in the Emergency Department obtained under dynamic ultrasound guidance with dark nonpulsatile blood return.  Catheter was flushed afterwards without any resistance or resultant extravisation.  IV catheter confirmed in compressible vein after insertion.

## 2018-10-08 NOTE — ED ADULT TRIAGE NOTE - CHIEF COMPLAINT QUOTE
Pt BIBA from [facility] after she had multiple mechanical falls at the facility. Pt was discharged from [facility] yesterday and diagnosed with UTI. Per staff, no LOC, no syncope, pt is A&Ox2 at baseline. No visible injuries, no c/o pain.

## 2018-10-08 NOTE — ED PROVIDER NOTE - ATTENDING CONTRIBUTION TO CARE
Dr. Cisneros: 89 yo female with HTN, COPD, dementia, CHF, ventral abdominal hernia, depression, discharged from [facility] yesterday with UTI (on Bactrim), in ED with multiple mechanical falls since yesterday.  Pt describes that she slipped while trying to get up from toilet.  She denies head injury or LOC.  Now complaining of pain in both hips but no other complaints at time of my eval.  On exam pt chronically-ill appearing but in NAD, heart RRR, lungs CTAB, abd NTND, extremities without swelling, strength 5/5 in all extremities in bed but hips bilaterally TTP (L>R) and skin without rash.

## 2018-10-08 NOTE — ED PROVIDER NOTE - MEDICAL DECISION MAKING DETAILS
89F PMH HTN, COPD, dementia, CHF, chronic ventral hernias and depression, admission for colitis 4/18 presenting from nursing home with mechanical falls that could be due to cardiac or from uti or old age imbalance. R/o bleed with cth. r.o fractures or dislocations with xrays of chest, pelvis, knees and ankles. Tylenol for pain. basic labs. IV ceftriaxone dose. Admit

## 2018-10-08 NOTE — ED PROCEDURE NOTE - PROCEDURE ADDITIONAL DETAILS
Peripheral IV access in the Emergency Department obtained under dynamic ultrasound guidance with dark nonpulsatile blood return.  Catheter was flushed afterwards without any resistance or resulting extravasation.  IV catheter confirmed in compressible vein after insertion.    20 GA placed in Right basilic vein.

## 2018-10-08 NOTE — ED PROVIDER NOTE - PROGRESS NOTE DETAILS
CTH neg for bleed. xrays neg for acute fractures of dislocations. NEeds to be admitted for PT to see and continue to treat uti.

## 2018-10-08 NOTE — ED ADULT NURSE NOTE - OBJECTIVE STATEMENT
pt presents to [facility], A&Ox1-2, ambulatory at baseline with assistance, here for evaluation of multiple falls. pt recently dx'd with a uti, was dc'd from another hospital yesterday. had two mechanical falls today. pt unable to localize pain but says it hurts. rectal temp 98.2, pt has small skin tear to left buttock and blanchable redness to sacrum. Denies any chest pain, dizziness, nausea, vomiting, shortness of breath, palpitations, diarrhea, fever, constipation, or chills.

## 2018-10-08 NOTE — ED ADULT NURSE NOTE - CHIEF COMPLAINT QUOTE
Pt BIBA from [facility] after she had multiple mechanical falls at the facility. Pt was discharged from [facility] yesterday and diagnosed with UTI. Per staff, no LOC, no syncope, pt is A&Ox2 at baseline. No visible injuries, no c/o pain.

## 2018-10-08 NOTE — H&P ADULT - NSHPPHYSICALEXAM_GEN_ALL_CORE
Vital Signs Last 24 Hrs  T(C): 36.6 (08 Oct 2018 20:07), Max: 36.8 (08 Oct 2018 13:11)  T(F): 97.9 (08 Oct 2018 20:07), Max: 98.2 (08 Oct 2018 13:11)  HR: 84 (08 Oct 2018 20:07) (82 - 90)  BP: 157/54 (08 Oct 2018 20:07) (101/48 - 163/66)  BP(mean): --  RR: 16 (08 Oct 2018 20:07) (16 - 18)  SpO2: 99% (08 Oct 2018 20:07) (96% - 100%)    PHYSICAL EXAM:  GENERAL: No Acute Distress  EYES: conjunctiva and sclera clear  ENMT: Moist mucous membranes   NECK: Supple  CHEST/LUNG: Clear to auscultation bilaterally  HEART: Regular rate and rhythm; No murmurs, rubs, or gallops  ABDOMEN: Soft, Nontender, Nondistended; Bowel sounds normal  EXTREMITIES:   No clubbing, cyanosis, or pedal edema  PSYCH: Normal Affect, Normal Behavior  NEUROLOGY:   - Mental status A&O x 2-3  SKIN: No rashes or lesions  MUSCULOSKELETAL: No joint swelling

## 2018-10-09 LAB
BACTERIA UR CULT: SIGNIFICANT CHANGE UP
BUN SERPL-MCNC: 28 MG/DL — HIGH (ref 7–23)
CALCIUM SERPL-MCNC: 9.4 MG/DL — SIGNIFICANT CHANGE UP (ref 8.4–10.5)
CHLORIDE SERPL-SCNC: 100 MMOL/L — SIGNIFICANT CHANGE UP (ref 98–107)
CO2 SERPL-SCNC: 21 MMOL/L — LOW (ref 22–31)
CREAT SERPL-MCNC: 1.36 MG/DL — HIGH (ref 0.5–1.3)
GLUCOSE SERPL-MCNC: 92 MG/DL — SIGNIFICANT CHANGE UP (ref 70–99)
HCT VFR BLD CALC: 37.5 % — SIGNIFICANT CHANGE UP (ref 34.5–45)
HGB BLD-MCNC: 11.8 G/DL — SIGNIFICANT CHANGE UP (ref 11.5–15.5)
MAGNESIUM SERPL-MCNC: 2.7 MG/DL — HIGH (ref 1.6–2.6)
MCHC RBC-ENTMCNC: 27.7 PG — SIGNIFICANT CHANGE UP (ref 27–34)
MCHC RBC-ENTMCNC: 31.5 % — LOW (ref 32–36)
MCV RBC AUTO: 88 FL — SIGNIFICANT CHANGE UP (ref 80–100)
NRBC # FLD: 0 — SIGNIFICANT CHANGE UP
PLATELET # BLD AUTO: 250 K/UL — SIGNIFICANT CHANGE UP (ref 150–400)
PMV BLD: 9.9 FL — SIGNIFICANT CHANGE UP (ref 7–13)
POTASSIUM SERPL-MCNC: 4.8 MMOL/L — SIGNIFICANT CHANGE UP (ref 3.5–5.3)
POTASSIUM SERPL-SCNC: 4.8 MMOL/L — SIGNIFICANT CHANGE UP (ref 3.5–5.3)
RBC # BLD: 4.26 M/UL — SIGNIFICANT CHANGE UP (ref 3.8–5.2)
RBC # FLD: 13 % — SIGNIFICANT CHANGE UP (ref 10.3–14.5)
SODIUM SERPL-SCNC: 134 MMOL/L — LOW (ref 135–145)
SPECIMEN SOURCE: SIGNIFICANT CHANGE UP
WBC # BLD: 7.35 K/UL — SIGNIFICANT CHANGE UP (ref 3.8–10.5)
WBC # FLD AUTO: 7.35 K/UL — SIGNIFICANT CHANGE UP (ref 3.8–10.5)

## 2018-10-09 PROCEDURE — 99233 SBSQ HOSP IP/OBS HIGH 50: CPT

## 2018-10-09 PROCEDURE — 99222 1ST HOSP IP/OBS MODERATE 55: CPT | Mod: GC

## 2018-10-09 RX ORDER — ACETAMINOPHEN 500 MG
1000 TABLET ORAL ONCE
Qty: 0 | Refills: 0 | Status: COMPLETED | OUTPATIENT
Start: 2018-10-09 | End: 2018-10-09

## 2018-10-09 RX ORDER — ACETAMINOPHEN 500 MG
650 TABLET ORAL EVERY 8 HOURS
Qty: 0 | Refills: 0 | Status: DISCONTINUED | OUTPATIENT
Start: 2018-10-09 | End: 2018-10-11

## 2018-10-09 RX ORDER — CEFTRIAXONE 500 MG/1
1 INJECTION, POWDER, FOR SOLUTION INTRAMUSCULAR; INTRAVENOUS EVERY 24 HOURS
Qty: 0 | Refills: 0 | Status: DISCONTINUED | OUTPATIENT
Start: 2018-10-09 | End: 2018-10-09

## 2018-10-09 RX ADMIN — Medication 80 MILLIGRAM(S): at 06:46

## 2018-10-09 RX ADMIN — Medication 650 MILLIGRAM(S): at 00:02

## 2018-10-09 RX ADMIN — Medication 500 MILLIGRAM(S): at 12:31

## 2018-10-09 RX ADMIN — SENNA PLUS 2 TABLET(S): 8.6 TABLET ORAL at 21:53

## 2018-10-09 RX ADMIN — Medication 650 MILLIGRAM(S): at 01:00

## 2018-10-09 RX ADMIN — Medication 25 MILLIGRAM(S): at 06:46

## 2018-10-09 RX ADMIN — HEPARIN SODIUM 5000 UNIT(S): 5000 INJECTION INTRAVENOUS; SUBCUTANEOUS at 18:01

## 2018-10-09 RX ADMIN — FAMOTIDINE 20 MILLIGRAM(S): 10 INJECTION INTRAVENOUS at 12:31

## 2018-10-09 RX ADMIN — Medication 650 MILLIGRAM(S): at 22:51

## 2018-10-09 RX ADMIN — ATORVASTATIN CALCIUM 10 MILLIGRAM(S): 80 TABLET, FILM COATED ORAL at 21:53

## 2018-10-09 RX ADMIN — Medication 650 MILLIGRAM(S): at 21:53

## 2018-10-09 RX ADMIN — MIRTAZAPINE 15 MILLIGRAM(S): 45 TABLET, ORALLY DISINTEGRATING ORAL at 21:53

## 2018-10-09 RX ADMIN — Medication 400 MILLIGRAM(S): at 01:54

## 2018-10-09 RX ADMIN — Medication 650 MILLIGRAM(S): at 15:05

## 2018-10-09 RX ADMIN — Medication 25 MILLIGRAM(S): at 18:01

## 2018-10-09 RX ADMIN — Medication 1000 MILLIGRAM(S): at 02:45

## 2018-10-09 RX ADMIN — CLOPIDOGREL BISULFATE 75 MILLIGRAM(S): 75 TABLET, FILM COATED ORAL at 12:31

## 2018-10-09 RX ADMIN — Medication 650 MILLIGRAM(S): at 09:04

## 2018-10-09 RX ADMIN — LORATADINE 10 MILLIGRAM(S): 10 TABLET ORAL at 12:31

## 2018-10-09 RX ADMIN — Medication 650 MILLIGRAM(S): at 14:10

## 2018-10-09 RX ADMIN — HEPARIN SODIUM 5000 UNIT(S): 5000 INJECTION INTRAVENOUS; SUBCUTANEOUS at 06:46

## 2018-10-09 RX ADMIN — Medication 650 MILLIGRAM(S): at 08:21

## 2018-10-09 RX ADMIN — Medication 3 MILLIGRAM(S): at 21:53

## 2018-10-09 RX ADMIN — Medication 1 TABLET(S): at 12:32

## 2018-10-09 NOTE — PHYSICAL THERAPY INITIAL EVALUATION ADULT - PERTINENT HX OF CURRENT PROBLEM, REHAB EVAL
89 y/o F with HTN, PPM, diastolic CHF, depression and chronic ventral hernias presents from [facility] with multiple falls.  Pt had presented to ED yesterday with c/o abdominal pain with workup showing positive UA.  She was discharged back to her assisted living with a course of Bactrim.  Pt fell today while in bathroom, landing on knees.  She has had another prior fall this week as well. 89 y/o F with HTN, PPM, diastolic CHF, depression and chronic ventral hernias presents from [facility] with multiple falls.  Pt had presented to ED 10/07/2018 with c/o abdominal pain with workup showing positive UA.  She was discharged back to her assisted living with a course of Bactrim.  Pt fell 10/08/2018 while in bathroom, landing on knees.  She has had another prior fall this week as well.

## 2018-10-09 NOTE — PROGRESS NOTE ADULT - ASSESSMENT
89 y/o F with diastolic CHF, HTN, PPM and depression p/w 2 falls in the past 2 days- urine cx w/ 3 organisms- pt w likely asymptomatic bacteuria.  Falls could be related to RLE weakness- PMR eval;

## 2018-10-09 NOTE — PHYSICAL THERAPY INITIAL EVALUATION ADULT - ADDITIONAL COMMENTS
Pt is a poor historian; information regarding pt's prior level of function needs to be verified. Pt is from [facility] where she reports that she was ambulating independently using rolling walker.    Pt left comfortable in reclined chair, NAD, all lines intact, all precautions maintained, with chair alarm on, and RN aware of PT evaluation.

## 2018-10-09 NOTE — PHYSICAL THERAPY INITIAL EVALUATION ADULT - PATIENT PROFILE REVIEW, REHAB EVAL
No formal activity order in the computer; spoke  with LILIYA Murray prior to PT evaluation--> Pt OK for PT consult/OOB activity/yes

## 2018-10-09 NOTE — PHYSICAL THERAPY INITIAL EVALUATION ADULT - ACTIVE RANGE OF MOTION EXAMINATION, REHAB EVAL
bilateral  lower extremity Active ROM was WFL (within functional limits)/Bilateral shoulder flexion ~75 degrees, bilateral elbows/hands/wrist WFL

## 2018-10-09 NOTE — CONSULT NOTE ADULT - SUBJECTIVE AND OBJECTIVE BOX
Patient is a 89y old  woman who presents with a chief complaint of Fall (08 Oct 2018 20:06)      HPI:  89 year-old woman  with HTN, PPM, diastolic CHF, depression and chronic ventral hernias presents from [facility] with multiple falls.  Pt had presented to ER with c/o abdominal pain with workup showing positive UA.  She was discharged back to her assisted living with a course of Bactrim.  Pt fell today while in bathroom, landing on knees.  She has had another prior fall this week as well.  Pt reports that she fell twice in the past 2 days but has not fallen prior to this.  Her only complaints at this time is pain in Right hip, knee, and ankle that she states is chronic, and preceded these two falls.    In the Er, pt was given ceftriaxone, and Tylenol.  She became agitated, and was given Haldol and Ativan. CT head negative. CT Abd/Pelvis neg. XR Lower ext neg for acute pathology.       REVIEW OF SYSTEMS  Constitutional: No fever, No weight loss, No fatigue  HEENT: No dysphagia,  No Headache,   Pulm: No cough, No SOB  Cardio: No chest pain, No palpitations  GI:  No abdominal pain, LBM __ ,  No incontinence  : No dysuria, No frequency, No retention, No incontinence  Neuro:  No loss of strength, No sensation defictis   Skin: No itching, No rash, No lesions   Endo: No Diabetic symptoms, No Thyroid symptoms  MSK: No joint pain, No joint swelling, No muscle pain   Psych:  No depression, No anxiety    PAST MEDICAL & SURGICAL HISTORY  Chronic constipation  hypertension  ventral hernia  History of gastroesophageal reflux (GERD)  COPD  CHF  s/p exploratory laparotomy for SBO x 2  H/O: hysterectomy  Hx of appendectomy      SOCIAL HISTORY  Smoking - Denied  EtOH - Denied   Drugs - Denied    FUNCTIONAL HISTORY  Lives at [facility] where she reports that she was ambulating independently using rolling walker.    CURRENT FUNCTIONAL STATUS  - Bed Mobility: not performed  - Transfers: sit - stand min a  - Gait: not performed  - ADLs: needs eval     ALLERGIES  Keppra (Unknown)  penicillin (Unknown)        FAMILY HISTORY   No pertinent family history in first degree relatives      VITALS  T(C): 36.7 (10-09-18 @ 06:17), Max: 36.8 (10-08-18 @ 13:11)  HR: 88 (10-09-18 @ 06:17) (82 - 91)  BP: 137/71 (10-09-18 @ 06:17) (101/48 - 163/66)  RR: 18 (10-09-18 @ 06:17) (16 - 18)  SpO2: 98% (10-09-18 @ 06:17) (95% - 100%)  Wt(kg): --    RECENT LABS/IMAGING  CBC Full  -  ( 09 Oct 2018 05:20 )  WBC Count : 7.35 K/uL  Hemoglobin : 11.8 g/dL  Hematocrit : 37.5 %  Platelet Count - Automated : 250 K/uL  Mean Cell Volume : 88.0 fL  Mean Cell Hemoglobin : 27.7 pg  Mean Cell Hemoglobin Concentration : 31.5 %  Auto Neutrophil # : x  Auto Lymphocyte # : x  Auto Monocyte # : x  Auto Eosinophil # : x  Auto Basophil # : x  Auto Neutrophil % : x  Auto Lymphocyte % : x  Auto Monocyte % : x  Auto Eosinophil % : x  Auto Basophil % : x    10-09    134<L>  |  100  |  28<H>  ----------------------------<  92  4.8   |  21<L>  |  1.36<H>    Ca    9.4      09 Oct 2018 05:20  Mg     2.7     10-09    TPro  8.0  /  Alb  3.9  /  TBili  0.7  /  DBili  x   /  AST  62<H>  /  ALT  21  /  AlkPhos  117  10-08    Urinalysis Basic - ( 07 Oct 2018 17:58 )    Color: YELLOW / Appearance: HAZY / S.017 / pH: 8.5  Gluc: NEGATIVE / Ketone: NEGATIVE  / Bili: NEGATIVE / Urobili: NORMAL   Blood: NEGATIVE / Protein: 70 / Nitrite: NEGATIVE   Leuk Esterase: LARGE / RBC: 5-10 / WBC >50   Sq Epi: x / Non Sq Epi: x / Bacteria: FEW          MEDICATIONS   acetaminophen   Tablet .. 650 milliGRAM(s) Oral every 8 hours  ascorbic acid 500 milliGRAM(s) Oral daily  atorvastatin 10 milliGRAM(s) Oral at bedtime  clopidogrel Tablet 75 milliGRAM(s) Oral daily  docusate sodium 100 milliGRAM(s) Oral three times a day PRN  famotidine    Tablet 20 milliGRAM(s) Oral daily  heparin  Injectable 5000 Unit(s) SubCutaneous every 12 hours  loratadine 10 milliGRAM(s) Oral daily  melatonin 3 milliGRAM(s) Oral at bedtime PRN  metoprolol tartrate 25 milliGRAM(s) Oral two times a day  mirtazapine 15 milliGRAM(s) Oral at bedtime  multivitamin 1 Tablet(s) Oral daily  senna 2 Tablet(s) Oral at bedtime      ---------------------------------------------------------------------------------------- Patient is a 89y old  woman who presents with a chief complaint of Fall (08 Oct 2018 20:06)      HPI:  89 year-old woman  with HTN, PPM, diastolic CHF, depression and chronic ventral hernias presents from [facility] with multiple falls.  Pt had presented to ER with c/o abdominal pain with workup showing positive UA.  She was discharged back to her assisted living with a course of Bactrim.  Pt fell today while in bathroom, landing on knees.  She has had another prior fall this week as well.  Pt reports that she fell twice in the past 2 days but has not fallen prior to this.  Her only complaints at this time is pain in Right hip, knee, and ankle that she states is chronic, and preceded these two falls.    In the Er, pt was given ceftriaxone, and Tylenol.  She became agitated, and was given Haldol and Ativan. CT head negative. CT Abd/Pelvis neg. XR Lower ext neg for acute pathology.       REVIEW OF SYSTEMS  Constitutional: No fever  HEENT: + hard of hearing   Pulm: No cough, No SOB  Cardio: No chest pain, No palpitations  GI:  No abdominal pain  : No dysuria  Neuro:  +loss of strength in legs, No sensation changes   Skin: No itching, No rash, No lesions   Endo: No Diabetic symptoms, No Thyroid symptoms  MSK: +joint pain, +joint swelling, No muscle pain   Psych:  No depression, No anxiety    PAST MEDICAL & SURGICAL HISTORY  Chronic constipation  hypertension  ventral hernia  History of gastroesophageal reflux (GERD)  COPD  CHF  s/p exploratory laparotomy for SBO x 2  H/O: hysterectomy  Hx of appendectomy      SOCIAL HISTORY  Smoking - Denied  EtOH - Denied   Drugs - Denied    FUNCTIONAL HISTORY  Lives at [facility] where she reports that she was ambulating independently using rolling walker.    CURRENT FUNCTIONAL STATUS  - Bed Mobility: not performed  - Transfers: sit - stand min a  - Gait: not performed  - ADLs: needs eval     ALLERGIES  Keppra (Unknown)  penicillin (Unknown)        FAMILY HISTORY   No pertinent family history in first degree relatives      VITALS  T(C): 36.7 (10-09-18 @ 06:17), Max: 36.8 (10-08-18 @ 13:11)  HR: 88 (10-09-18 @ 06:17) (82 - 91)  BP: 137/71 (10-09-18 @ 06:17) (101/48 - 163/66)  RR: 18 (10-09-18 @ 06:17) (16 - 18)  SpO2: 98% (10-09-18 @ 06:17) (95% - 100%)  Wt(kg): --    RECENT LABS/IMAGING  CBC Full  -  ( 09 Oct 2018 05:20 )  WBC Count : 7.35 K/uL  Hemoglobin : 11.8 g/dL  Hematocrit : 37.5 %  Platelet Count - Automated : 250 K/uL  Mean Cell Volume : 88.0 fL  Mean Cell Hemoglobin : 27.7 pg  Mean Cell Hemoglobin Concentration : 31.5 %  Auto Neutrophil # : x  Auto Lymphocyte # : x  Auto Monocyte # : x  Auto Eosinophil # : x  Auto Basophil # : x  Auto Neutrophil % : x  Auto Lymphocyte % : x  Auto Monocyte % : x  Auto Eosinophil % : x  Auto Basophil % : x    10-09    134<L>  |  100  |  28<H>  ----------------------------<  92  4.8   |  21<L>  |  1.36<H>    Ca    9.4      09 Oct 2018 05:20  Mg     2.7     10-09    TPro  8.0  /  Alb  3.9  /  TBili  0.7  /  DBili  x   /  AST  62<H>  /  ALT  21  /  AlkPhos  117  10-08    Urinalysis Basic - ( 07 Oct 2018 17:58 )    Color: YELLOW / Appearance: HAZY / S.017 / pH: 8.5  Gluc: NEGATIVE / Ketone: NEGATIVE  / Bili: NEGATIVE / Urobili: NORMAL   Blood: NEGATIVE / Protein: 70 / Nitrite: NEGATIVE   Leuk Esterase: LARGE / RBC: 5-10 / WBC >50   Sq Epi: x / Non Sq Epi: x / Bacteria: FEW          MEDICATIONS   acetaminophen   Tablet .. 650 milliGRAM(s) Oral every 8 hours  ascorbic acid 500 milliGRAM(s) Oral daily  atorvastatin 10 milliGRAM(s) Oral at bedtime  clopidogrel Tablet 75 milliGRAM(s) Oral daily  docusate sodium 100 milliGRAM(s) Oral three times a day PRN  famotidine    Tablet 20 milliGRAM(s) Oral daily  heparin  Injectable 5000 Unit(s) SubCutaneous every 12 hours  loratadine 10 milliGRAM(s) Oral daily  melatonin 3 milliGRAM(s) Oral at bedtime PRN  metoprolol tartrate 25 milliGRAM(s) Oral two times a day  mirtazapine 15 milliGRAM(s) Oral at bedtime  multivitamin 1 Tablet(s) Oral daily  senna 2 Tablet(s) Oral at bedtime      ----------------------------------------------------------------------------------------        PHYSICAL EXAM  Constitutional: NAD, Resting Comfortable  HEENT: NC/AT, Normal Conjunctivae, EOMI, PERRLA   Neck: Supple, FROM,   Chest: No Respiratory Distress,  Lungs CTAB, No Rales/Rhonchi/Wheezing  CV: RRR, Normal S1-S2, No Murmurs/Gallops/Rubs, No Peripheral Edema  Abdomen: ND/NT, Soft, BS+  MSK: +joint swelling on the right knee   Ext: No C/C/E, Pulses 2+ throughout, No calf tenderness   Neuro Exam      Cognitive: AAO x 3     Communication:  Fluent, No dysarthria                  LEFT    UE:  SF 5/5, EF 5/5, EE 5/5, WE 5/5, Hand intrinsic 5/5,  wnl            RIGHT UE:  SF 5/5, EF 5/5, EE 5/5, WE 5/5, Hand Intrinsic 5/5,  wnl             LEFT    LE:  HF 4/5, KE 4/5, DF 5/5, EHL 5/5,  PF 5/5             RIGHT LE:  HF 3+/5, KE 4/5, DF 5/5, EHL 5/5, PF 5/5        Sensory: Intact to light touch     Tone: Normal  Psychiatric: Affect WNL  Functional:     ASSESSMENT/PLAN  89 year-old woman  with HTN, PPM, diastolic CHF, depression and chronic ventral hernias presents with multiple falls. Patient has impairment with transfers.       Recommend   - Will continue to follow for ongoing rehab needs and recommendations  - obtain PT for Bed Mobility, Transfers, Ambulation with AD   - obtain OT eval for ADLs  - Turn Q2hrs while in bed, OOB to chair when possible to prevent Pressure Injury Patient is a 89y old  woman who presents with a chief complaint of Fall (08 Oct 2018 20:06)      HPI:  89 year-old woman  with HTN, PPM, diastolic CHF, depression and chronic ventral hernias presents from [facility] with multiple falls.  Pt had presented to ER with c/o abdominal pain with workup showing positive UA.  She was discharged back to her assisted living with a course of Bactrim.  Pt fell today while in bathroom, landing on knees.  She has had another prior fall this week as well.  Pt reports that she fell twice in the past 2 days but has not fallen prior to this.  Her only complaints at this time is pain in Right hip, knee, and ankle that she states is chronic, and preceded these two falls.    In the Er, pt was given ceftriaxone, and Tylenol.  She became agitated, and was given Haldol and Ativan. CT head negative. CT Abd/Pelvis neg. XR Lower ext neg for acute pathology.       REVIEW OF SYSTEMS  Constitutional: No fever  HEENT: + hard of hearing   Pulm: No cough, No SOB  Cardio: No chest pain, No palpitations  GI:  No abdominal pain  : No dysuria  Neuro:  +loss of strength in legs, No sensation changes   Skin: No itching, No rash, No lesions   Endo: No Diabetic symptoms, No Thyroid symptoms  MSK: +joint pain, +joint swelling, No muscle pain   Psych:  No depression, No anxiety    PAST MEDICAL & SURGICAL HISTORY  Chronic constipation  hypertension  ventral hernia  History of gastroesophageal reflux (GERD)  COPD  CHF  s/p exploratory laparotomy for SBO x 2  H/O: hysterectomy  Hx of appendectomy      SOCIAL HISTORY  Smoking - Denied  EtOH - Denied   Drugs - Denied    FUNCTIONAL HISTORY  Lives at [facility] where she reports that she was ambulating independently using rolling walker.    CURRENT FUNCTIONAL STATUS  - Bed Mobility: not performed  - Transfers: sit - stand min a  - Gait: not performed  - ADLs: needs eval     ALLERGIES  Keppra (Unknown)  penicillin (Unknown)        FAMILY HISTORY   No pertinent family history in first degree relatives      VITALS  T(C): 36.7 (10-09-18 @ 06:17), Max: 36.8 (10-08-18 @ 13:11)  HR: 88 (10-09-18 @ 06:17) (82 - 91)  BP: 137/71 (10-09-18 @ 06:17) (101/48 - 163/66)  RR: 18 (10-09-18 @ 06:17) (16 - 18)  SpO2: 98% (10-09-18 @ 06:17) (95% - 100%)  Wt(kg): --    RECENT LABS/IMAGING  CBC Full  -  ( 09 Oct 2018 05:20 )  WBC Count : 7.35 K/uL  Hemoglobin : 11.8 g/dL  Hematocrit : 37.5 %  Platelet Count - Automated : 250 K/uL  Mean Cell Volume : 88.0 fL  Mean Cell Hemoglobin : 27.7 pg  Mean Cell Hemoglobin Concentration : 31.5 %  Auto Neutrophil # : x  Auto Lymphocyte # : x  Auto Monocyte # : x  Auto Eosinophil # : x  Auto Basophil # : x  Auto Neutrophil % : x  Auto Lymphocyte % : x  Auto Monocyte % : x  Auto Eosinophil % : x  Auto Basophil % : x    10-09    134<L>  |  100  |  28<H>  ----------------------------<  92  4.8   |  21<L>  |  1.36<H>    Ca    9.4      09 Oct 2018 05:20  Mg     2.7     10-09    TPro  8.0  /  Alb  3.9  /  TBili  0.7  /  DBili  x   /  AST  62<H>  /  ALT  21  /  AlkPhos  117  10-08    Urinalysis Basic - ( 07 Oct 2018 17:58 )    Color: YELLOW / Appearance: HAZY / S.017 / pH: 8.5  Gluc: NEGATIVE / Ketone: NEGATIVE  / Bili: NEGATIVE / Urobili: NORMAL   Blood: NEGATIVE / Protein: 70 / Nitrite: NEGATIVE   Leuk Esterase: LARGE / RBC: 5-10 / WBC >50   Sq Epi: x / Non Sq Epi: x / Bacteria: FEW          MEDICATIONS   acetaminophen   Tablet .. 650 milliGRAM(s) Oral every 8 hours  ascorbic acid 500 milliGRAM(s) Oral daily  atorvastatin 10 milliGRAM(s) Oral at bedtime  clopidogrel Tablet 75 milliGRAM(s) Oral daily  docusate sodium 100 milliGRAM(s) Oral three times a day PRN  famotidine    Tablet 20 milliGRAM(s) Oral daily  heparin  Injectable 5000 Unit(s) SubCutaneous every 12 hours  loratadine 10 milliGRAM(s) Oral daily  melatonin 3 milliGRAM(s) Oral at bedtime PRN  metoprolol tartrate 25 milliGRAM(s) Oral two times a day  mirtazapine 15 milliGRAM(s) Oral at bedtime  multivitamin 1 Tablet(s) Oral daily  senna 2 Tablet(s) Oral at bedtime      ----------------------------------------------------------------------------------------        PHYSICAL EXAM  Constitutional: NAD, Resting Comfortable  HEENT: NC/AT, Normal Conjunctivae, EOMI, PERRLA   Neck: Supple, FROM,   Chest: No Respiratory Distress,  Lungs CTAB, No Rales/Rhonchi/Wheezing  CV: RRR, Normal S1-S2, No Murmurs/Gallops/Rubs, No Peripheral Edema  Abdomen: ND/NT, Soft, BS+  MSK: +joint swelling on the right knee   Ext: No C/C/E, Pulses 2+ throughout, No calf tenderness   Neuro Exam      Cognitive: AAO x 3     Communication:  Fluent, No dysarthria                  LEFT    UE:  SF 5/5, EF 5/5, EE 5/5, WE 5/5, Hand intrinsic 5/5,  wnl            RIGHT UE:  SF 5/5, EF 5/5, EE 5/5, WE 5/5, Hand Intrinsic 5/5,  wnl             LEFT    LE:  HF 4/5, KE 4/5, DF 5/5, EHL 5/5,  PF 5/5             RIGHT LE:  HF 3+/5, KE 4/5, DF 5/5, EHL 5/5, PF 5/5        Sensory: Intact to light touch     Tone: Normal  Psychiatric: Affect WNL  Functional:     ASSESSMENT/PLAN  89 year-old woman  with HTN, PPM, diastolic CHF, depression and chronic ventral hernias presents with multiple falls. Patient has impairment with transfers.       Recommend   - Will continue to follow for ongoing rehab needs and recommendations  - obtain PT for Bed Mobility, Transfers, Ambulation with AD   - obtain OT eval for ADLs  - Turn Q2hrs while in bed, OOB to chair when possible to prevent Pressure Injury   - KOKO - monitor sumit holly pre-renal - hydration Patient is a 89y old  woman who presents with a chief complaint of Fall (08 Oct 2018 20:06)      HPI:  89 year-old woman  with HTN, PPM, diastolic CHF, depression and chronic ventral hernias presents from [facility] with multiple falls.  Pt had presented to ER with c/o abdominal pain with workup showing positive UA.  She was discharged back to her assisted living with a course of Bactrim.  Pt fell today while in bathroom, landing on knees.  She has had another prior fall this week as well.  Pt reports that she fell twice in the past 2 days but has not fallen prior to this.  Her only complaints at this time is pain in Right hip, knee, and ankle that she states is chronic, and preceded these two falls.    In the Er, pt was given ceftriaxone, and Tylenol.  She became agitated, and was given Haldol and Ativan. CT head negative. CT Abd/Pelvis neg. XR Lower ext neg for acute pathology.       REVIEW OF SYSTEMS  Constitutional: No fever  HEENT: + hard of hearing   Pulm: No cough, No SOB  Cardio: No chest pain, No palpitations  GI:  No abdominal pain  : No dysuria  Neuro:  +loss of strength in legs, No sensation changes   Skin: No itching, No rash, No lesions   Endo: No Diabetic symptoms, No Thyroid symptoms  MSK: +joint pain, +joint swelling, No muscle pain   Psych:  No depression, No anxiety    PAST MEDICAL & SURGICAL HISTORY  Chronic constipation  hypertension  ventral hernia  History of gastroesophageal reflux (GERD)  COPD  CHF  s/p exploratory laparotomy for SBO x 2  H/O: hysterectomy  Hx of appendectomy      SOCIAL HISTORY  Smoking - Denied  EtOH - Denied   Drugs - Denied    FUNCTIONAL HISTORY  Lives at [facility] where she reports that she was ambulating independently using rolling walker.    CURRENT FUNCTIONAL STATUS  - Bed Mobility: not performed  - Transfers: sit - stand min a  - Gait: not performed  - ADLs: needs eval     ALLERGIES  Keppra (Unknown)  penicillin (Unknown)        FAMILY HISTORY   No pertinent family history in first degree relatives      VITALS  T(C): 36.7 (10-09-18 @ 06:17), Max: 36.8 (10-08-18 @ 13:11)  HR: 88 (10-09-18 @ 06:17) (82 - 91)  BP: 137/71 (10-09-18 @ 06:17) (101/48 - 163/66)  RR: 18 (10-09-18 @ 06:17) (16 - 18)  SpO2: 98% (10-09-18 @ 06:17) (95% - 100%)  Wt(kg): --    RECENT LABS/IMAGING  CBC Full  -  ( 09 Oct 2018 05:20 )  WBC Count : 7.35 K/uL  Hemoglobin : 11.8 g/dL  Hematocrit : 37.5 %  Platelet Count - Automated : 250 K/uL  Mean Cell Volume : 88.0 fL  Mean Cell Hemoglobin : 27.7 pg  Mean Cell Hemoglobin Concentration : 31.5 %  Auto Neutrophil # : x  Auto Lymphocyte # : x  Auto Monocyte # : x  Auto Eosinophil # : x  Auto Basophil # : x  Auto Neutrophil % : x  Auto Lymphocyte % : x  Auto Monocyte % : x  Auto Eosinophil % : x  Auto Basophil % : x    10-09    134<L>  |  100  |  28<H>  ----------------------------<  92  4.8   |  21<L>  |  1.36<H>    Ca    9.4      09 Oct 2018 05:20  Mg     2.7     10-09    TPro  8.0  /  Alb  3.9  /  TBili  0.7  /  DBili  x   /  AST  62<H>  /  ALT  21  /  AlkPhos  117  10-08    Urinalysis Basic - ( 07 Oct 2018 17:58 )    Color: YELLOW / Appearance: HAZY / S.017 / pH: 8.5  Gluc: NEGATIVE / Ketone: NEGATIVE  / Bili: NEGATIVE / Urobili: NORMAL   Blood: NEGATIVE / Protein: 70 / Nitrite: NEGATIVE   Leuk Esterase: LARGE / RBC: 5-10 / WBC >50   Sq Epi: x / Non Sq Epi: x / Bacteria: FEW          MEDICATIONS   acetaminophen   Tablet .. 650 milliGRAM(s) Oral every 8 hours  ascorbic acid 500 milliGRAM(s) Oral daily  atorvastatin 10 milliGRAM(s) Oral at bedtime  clopidogrel Tablet 75 milliGRAM(s) Oral daily  docusate sodium 100 milliGRAM(s) Oral three times a day PRN  famotidine    Tablet 20 milliGRAM(s) Oral daily  heparin  Injectable 5000 Unit(s) SubCutaneous every 12 hours  loratadine 10 milliGRAM(s) Oral daily  melatonin 3 milliGRAM(s) Oral at bedtime PRN  metoprolol tartrate 25 milliGRAM(s) Oral two times a day  mirtazapine 15 milliGRAM(s) Oral at bedtime  multivitamin 1 Tablet(s) Oral daily  senna 2 Tablet(s) Oral at bedtime      ----------------------------------------------------------------------------------------        PHYSICAL EXAM  Constitutional: NAD, Resting Comfortable  HEENT: NC/AT, Normal Conjunctivae, EOMI, PERRLA   Neck: Supple, FROM,   Chest: No Respiratory Distress,  Lungs CTAB, No Rales/Rhonchi/Wheezing  CV: RRR, Normal S1-S2, No Murmurs/Gallops/Rubs, No Peripheral Edema  Abdomen: ND/NT, Soft, BS+  MSK: +joint swelling on the right knee   Ext: No C/C/E, Pulses 2+ throughout, No calf tenderness   Neuro Exam      Cognitive: AAO x 3     Communication:  Fluent, No dysarthria                  LEFT    UE:  SF 5/5, EF 5/5, EE 5/5, WE 5/5, Hand intrinsic 5/5,  wnl            RIGHT UE:  SF 5/5, EF 5/5, EE 5/5, WE 5/5, Hand Intrinsic 5/5,  wnl             LEFT    LE:  HF 4/5, KE 4/5, DF 5/5, EHL 5/5,  PF 5/5             RIGHT LE:  HF 3+/5, KE 4/5, DF 5/5, EHL 5/5, PF 5/5        Sensory: Intact to light touch     Tone: Normal  Psychiatric: Affect WNL  Functional:     ASSESSMENT/PLAN  89 year-old woman  with HTN, PPM, diastolic CHF, depression and chronic ventral hernias presents with multiple falls it is likely multi-factorial, falls not likely contributed to by solely spinal stenosis in absence of back pain and numbness/tingling in extremities.     recurrent falls likely multifactorial   Recommend   - Will continue to follow for ongoing rehab needs and recommendations  - metabolic work up - folate, B12, TSH, Syphilis   - medrol dose george for knee pain and swelling, can consider local intra-articular steroid injection.   - obtain PT for Bed Mobility, Transfers, Ambulation with AD   - obtain OT eval for ADLs  - Turn Q2hrs while in bed, OOB to chair when possible to prevent Pressure Injury   - KOKO - monitor bmp, likely pre-renal - hydration  - avoid sedating medications- if agitated use conservative measures and avoid 1st generation antipsychotics Patient is a 89y old  woman who presents with a chief complaint of Fall (08 Oct 2018 20:06)      HPI:  89 year-old woman  with HTN, PPM, diastolic CHF, depression and chronic ventral hernias presents from [facility] with multiple falls.  Pt had presented to ER with c/o abdominal pain with workup showing positive UA.  She was discharged back to her assisted living with a course of Bactrim.  Pt fell today while in bathroom, landing on knees.  She has had another prior fall this week as well.  Pt reports that she fell twice in the past 2 days but has not fallen prior to this.  Her only complaints at this time is pain in Right hip, knee, and ankle that she states is chronic, and preceded these two falls.    In the Er, pt was given ceftriaxone, and Tylenol.  She became agitated, and was given Haldol and Ativan. CT head negative. CT Abd/Pelvis neg. XR Lower ext neg for acute pathology.       REVIEW OF SYSTEMS  Constitutional: No fever  HEENT: + hard of hearing   Pulm: No cough, No SOB  Cardio: No chest pain, No palpitations  GI:  No abdominal pain  : No dysuria  Neuro:  +loss of strength in legs, No sensation changes   Skin: No itching, No rash, No lesions   Endo: No Diabetic symptoms, No Thyroid symptoms  MSK: +joint pain, +joint swelling, No muscle pain   Psych:  No depression, No anxiety    PAST MEDICAL & SURGICAL HISTORY  Chronic constipation  hypertension  ventral hernia  History of gastroesophageal reflux (GERD)  COPD  CHF  s/p exploratory laparotomy for SBO x 2  H/O: hysterectomy  Hx of appendectomy      SOCIAL HISTORY  Smoking - Denied  EtOH - Denied   Drugs - Denied    FUNCTIONAL HISTORY  Lives at [facility] where she reports that she was ambulating independently using rolling walker.    CURRENT FUNCTIONAL STATUS  - Bed Mobility: not performed  - Transfers: sit - stand min a  - Gait: not performed  - ADLs: needs eval     ALLERGIES  Keppra (Unknown)  penicillin (Unknown)        FAMILY HISTORY   No pertinent family history in first degree relatives      VITALS  T(C): 36.7 (10-09-18 @ 06:17), Max: 36.8 (10-08-18 @ 13:11)  HR: 88 (10-09-18 @ 06:17) (82 - 91)  BP: 137/71 (10-09-18 @ 06:17) (101/48 - 163/66)  RR: 18 (10-09-18 @ 06:17) (16 - 18)  SpO2: 98% (10-09-18 @ 06:17) (95% - 100%)  Wt(kg): --    RECENT LABS/IMAGING  CBC Full  -  ( 09 Oct 2018 05:20 )  WBC Count : 7.35 K/uL  Hemoglobin : 11.8 g/dL  Hematocrit : 37.5 %  Platelet Count - Automated : 250 K/uL  Mean Cell Volume : 88.0 fL  Mean Cell Hemoglobin : 27.7 pg  Mean Cell Hemoglobin Concentration : 31.5 %  Auto Neutrophil # : x  Auto Lymphocyte # : x  Auto Monocyte # : x  Auto Eosinophil # : x  Auto Basophil # : x  Auto Neutrophil % : x  Auto Lymphocyte % : x  Auto Monocyte % : x  Auto Eosinophil % : x  Auto Basophil % : x    10-09    134<L>  |  100  |  28<H>  ----------------------------<  92  4.8   |  21<L>  |  1.36<H>    Ca    9.4      09 Oct 2018 05:20  Mg     2.7     10-09    TPro  8.0  /  Alb  3.9  /  TBili  0.7  /  DBili  x   /  AST  62<H>  /  ALT  21  /  AlkPhos  117  10-08    Urinalysis Basic - ( 07 Oct 2018 17:58 )    Color: YELLOW / Appearance: HAZY / S.017 / pH: 8.5  Gluc: NEGATIVE / Ketone: NEGATIVE  / Bili: NEGATIVE / Urobili: NORMAL   Blood: NEGATIVE / Protein: 70 / Nitrite: NEGATIVE   Leuk Esterase: LARGE / RBC: 5-10 / WBC >50   Sq Epi: x / Non Sq Epi: x / Bacteria: FEW          MEDICATIONS   acetaminophen   Tablet .. 650 milliGRAM(s) Oral every 8 hours  ascorbic acid 500 milliGRAM(s) Oral daily  atorvastatin 10 milliGRAM(s) Oral at bedtime  clopidogrel Tablet 75 milliGRAM(s) Oral daily  docusate sodium 100 milliGRAM(s) Oral three times a day PRN  famotidine    Tablet 20 milliGRAM(s) Oral daily  heparin  Injectable 5000 Unit(s) SubCutaneous every 12 hours  loratadine 10 milliGRAM(s) Oral daily  melatonin 3 milliGRAM(s) Oral at bedtime PRN  metoprolol tartrate 25 milliGRAM(s) Oral two times a day  mirtazapine 15 milliGRAM(s) Oral at bedtime  multivitamin 1 Tablet(s) Oral daily  senna 2 Tablet(s) Oral at bedtime      ----------------------------------------------------------------------------------------        PHYSICAL EXAM  Constitutional: NAD, Resting Comfortable  HEENT: NC/AT, Normal Conjunctivae, EOMI, PERRLA   Neck: Supple, FROM,   Chest: No Respiratory Distress,  Lungs CTAB, No Rales/Rhonchi/Wheezing  CV: RRR, Normal S1-S2, No Murmurs/Gallops/Rubs, No Peripheral Edema  Abdomen: ND/NT, Soft, BS+  MSK: +joint swelling on the right knee   Ext: No C/C/E, Pulses 2+ throughout, No calf tenderness   Neuro Exam      Cognitive: AAO x 3     Communication:  Fluent, No dysarthria                  LEFT    UE:  SF 5/5, EF 5/5, EE 5/5, WE 5/5, Hand intrinsic 5/5,  wnl            RIGHT UE:  SF 5/5, EF 5/5, EE 5/5, WE 5/5, Hand Intrinsic 5/5,  wnl             LEFT    LE:  HF 4/5, KE 4/5, DF 5/5, EHL 5/5,  PF 5/5             RIGHT LE:  HF 3+/5, KE 4/5, DF 5/5, EHL 5/5, PF 5/5        Sensory: Intact to light touch     Tone: Normal  Psychiatric: Affect WNL  Functional:     ASSESSMENT/PLAN  89 year-old woman  with HTN, PPM, diastolic CHF, depression and chronic ventral hernias presents with multiple falls it is likely multi-factorial, falls not likely contributed to by solely spinal stenosis in absence of back pain and numbness/tingling in extremities.     recurrent falls likely multifactorial   Recommend   - Will continue to follow for ongoing rehab needs and recommendations  - metabolic work up - folate, B12, TSH, Syphilis   - medrol dose george for knee pain and swelling, can consider local intra-articular steroid injection.   - obtain PT for Bed Mobility, Transfers, Ambulation with AD   - obtain OT eval for ADLs  - Turn Q2hrs while in bed, OOB to chair when possible to prevent Pressure Injury

## 2018-10-09 NOTE — PROGRESS NOTE ADULT - SUBJECTIVE AND OBJECTIVE BOX
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 650 milliGRAM(s) Oral every 8 hours  ascorbic acid 500 milliGRAM(s) Oral daily  atorvastatin 10 milliGRAM(s) Oral at bedtime  clopidogrel Tablet 75 milliGRAM(s) Oral daily  famotidine    Tablet 20 milliGRAM(s) Oral daily  heparin  Injectable 5000 Unit(s) SubCutaneous every 12 hours  loratadine 10 milliGRAM(s) Oral daily  metoprolol tartrate 25 milliGRAM(s) Oral two times a day  mirtazapine 15 milliGRAM(s) Oral at bedtime  multivitamin 1 Tablet(s) Oral daily  senna 2 Tablet(s) Oral at bedtime    MEDICATIONS  (PRN):  docusate sodium 100 milliGRAM(s) Oral three times a day PRN Constipation  melatonin 3 milliGRAM(s) Oral at bedtime PRN Insomnia      Meds ordered within last 24hours  haloperidol    Injectable: [Ordered as HALDOL Injectable]  5 milliGRAM(s), IV Push, Once, Stop After 1 Doses  Provider's Contact #: [phone] (10-08 @ 17:13)  LORazepam   Injectable: [Ordered as ATIVAN Injectable]  1 milliGRAM(s), IV Push, Once, Stop After 1 Doses  Administration Instructions: This is a Look-alike/Sound-alike Medication  Provider's Contact #: [phone] (10-08 @ 17:13)  heparin  Injectable:   5000 Unit(s), SubCutaneous, every 12 hours  Provider's Contact #: [phone] (10-08 @ 19:11)  acetaminophen   Tablet ..: [Known as TYLENOL..]  650 milliGRAM(s), Oral, every 6 hours, PRN for Temp greater or equal to 38C (100.4F), Moderate Pain (4 - 6), Severe Pain (7 - 10)  Administration Instructions: MAX DAILY DOSE:  ADULT = 4,000 mG/Day  Provider's Contact #: [phone] (10-08 @ 21:51)  mirtazapine: [Known as REMERON]  15 milliGRAM(s), Oral, at bedtime  Provider's Contact #: [phone] (10-08 @ 21:51)  loratadine: [Known as CLARITIN]  10 milliGRAM(s), Oral, daily  Provider's Contact #: [phone] (10-08 @ 21:51)  atorvastatin: [Known as LIPITOR]  10 milliGRAM(s), Oral, at bedtime (10-08 @ 21:51)  clopidogrel Tablet: [Known as PLAVIX]  75 milliGRAM(s), Oral, daily  Provider's Contact #: [phone] (10-08 @ 21:51)  metoprolol tartrate: [Known as LOPRESSOR]  25 milliGRAM(s), Oral, two times a day  Special Instructions: hold if SBP < 100, HR < 60  Provider's Contact #: [phone] (10-08 @ 21:51)  furosemide    Tablet: [Known as LASIX]  80 milliGRAM(s), Oral, daily  Special Instructions: Hold if SBP < 100  Provider's Contact #: [phone] (10-08 @ 21:51)  famotidine    Tablet: [Known as PEPCID]  20 milliGRAM(s), Oral, daily  Provider's Contact #: [phone] (10-08 @ 21:51)  docusate sodium: [Known as COLACE]  100 milliGRAM(s), Oral, three times a day, PRN for Constipation  Provider's Contact #: [phone] (10-08 @ 21:51)  senna:   2 Tablet(s), Oral, at bedtime  Provider's Contact #: [phone] (10-08 @ 21:51)  melatonin:   3 milliGRAM(s), Oral, at bedtime, PRN for Insomnia  Indication: Insomnia  Provider's Contact #: [phone] (10-08 @ 21:51)  multivitamin:   1 Tablet(s), Oral, daily  Provider's Contact #: [phone] (10-08 @ 21:51)  ascorbic acid: [Known as VITAMIN C]  500 milliGRAM(s), Oral, daily  Provider's Contact #: [phone] (10-08 @ 21:51)  acetaminophen  IVPB ..: [Known as OFIRMEV.]  1000 milliGRAM(s) in IV Solution 100 milliLiter(s), IV Intermittent, once, PRN for Severe Pain (7 - 10), infuse over 15 Minute(s), Stop After 1 Doses  Administration Instructions: MAX DAILY DOSE:  ADULT = 4,000 mG/Day  Provider's Contact #: [phone] (10-09 @ 01:32)  cefTRIAXone   IVPB: [Known as ROCEPHIN  IVPB]  1 Gram(s) in dextrose 5% 50 milliLiter(s), IV Intermittent, every 24 hours, infuse over 30 Minute(s)  Indication: UTI  Administration Instructions: This is a Look-alike/Sound-alike Medication  Provider's Contact #: [phone] (10-09 @ 09:55)  acetaminophen   Tablet ..: [Known as TYLENOL..]  650 milliGRAM(s), Oral, every 8 hours, Stop After 3 Days  Administration Instructions: MAX DAILY DOSE:  ADULT = 4,000 mG/Day (10-09 @ 11:01)      T(C): 36.3 (10-09-18 @ 13:38), Max: 36.7 (10-08-18 @ 20:06)  HR: 85 (10-09-18 @ 13:38) (84 - 91)  BP: 148/60 (10-09-18 @ 13:38) (101/48 - 157/54)  RR: 18 (10-09-18 @ 13:38) (16 - 18)  SpO2: 100% (10-09-18 @ 13:38) (95% - 100%)    CAPILLARY BLOOD GLUCOSE        I&O's Summary      PHYSICAL EXAM:  GENERAL: NAD  CHEST/LUNG: Clear to auscultation bilaterally; No wheeze  HEART: Regular rate and rhythm; No murmurs, rubs, or gallops  ABDOMEN: Soft, Nontender, Nondistended; Bowel sounds present  EXTREMITIES:  No clubbing, cyanosis, or edema  NEURO: A&Ox2; RLE 4/5 LLE       LABS:                        11.8   7.35  )-----------( 250      ( 09 Oct 2018 05:20 )             37.5     10-09    134<L>  |  100  |  28<H>  ----------------------------<  92  4.8   |  21<L>  |  1.36<H>    Ca    9.4      09 Oct 2018 05:20  Mg     2.7     10-09    TPro  8.0  /  Alb  3.9  /  TBili  0.7  /  DBili  x   /  AST  62<H>  /  ALT  21  /  AlkPhos  117  10-08    PT/INR - ( 07 Oct 2018 20:30 )   PT: 10.9 SEC;   INR: 0.98          PTT - ( 07 Oct 2018 20:30 )  PTT:35.9 SEC      Urinalysis Basic - ( 07 Oct 2018 17:58 )    Color: YELLOW / Appearance: HAZY / S.017 / pH: 8.5  Gluc: NEGATIVE / Ketone: NEGATIVE  / Bili: NEGATIVE / Urobili: NORMAL   Blood: NEGATIVE / Protein: 70 / Nitrite: NEGATIVE   Leuk Esterase: LARGE / RBC: 5-10 / WBC >50   Sq Epi: x / Non Sq Epi: x / Bacteria: FEW        RADIOLOGY & ADDITIONAL TESTS:    Imaging Personally Reviewed:    Consultant(s) Notes Reviewed:      Care Discussed with Consultants/Other Providers:

## 2018-10-09 NOTE — PHYSICAL THERAPY INITIAL EVALUATION ADULT - DIAGNOSIS, PT EVAL
Pt admitted for multiple falls; X-Ray (-); CT of head showed Volume loss and microvascular disease without evidence for acute hemorrhage or midline shift; pt presents with decreased strength, decreased balance, and difficulty with ambulation.

## 2018-10-10 ENCOUNTER — TRANSCRIPTION ENCOUNTER (OUTPATIENT)
Age: 83
End: 2018-10-10

## 2018-10-10 LAB
BASOPHILS # BLD AUTO: 0.04 K/UL — SIGNIFICANT CHANGE UP (ref 0–0.2)
BASOPHILS NFR BLD AUTO: 0.6 % — SIGNIFICANT CHANGE UP (ref 0–2)
BUN SERPL-MCNC: 32 MG/DL — HIGH (ref 7–23)
CALCIUM SERPL-MCNC: 9.8 MG/DL — SIGNIFICANT CHANGE UP (ref 8.4–10.5)
CHLORIDE SERPL-SCNC: 99 MMOL/L — SIGNIFICANT CHANGE UP (ref 98–107)
CO2 SERPL-SCNC: 23 MMOL/L — SIGNIFICANT CHANGE UP (ref 22–31)
CREAT SERPL-MCNC: 1.17 MG/DL — SIGNIFICANT CHANGE UP (ref 0.5–1.3)
EOSINOPHIL # BLD AUTO: 0.11 K/UL — SIGNIFICANT CHANGE UP (ref 0–0.5)
EOSINOPHIL NFR BLD AUTO: 1.7 % — SIGNIFICANT CHANGE UP (ref 0–6)
FOLATE SERPL-MCNC: 14 NG/ML — SIGNIFICANT CHANGE UP (ref 4.7–20)
GLUCOSE SERPL-MCNC: 105 MG/DL — HIGH (ref 70–99)
HCT VFR BLD CALC: 35.1 % — SIGNIFICANT CHANGE UP (ref 34.5–45)
HGB BLD-MCNC: 11.5 G/DL — SIGNIFICANT CHANGE UP (ref 11.5–15.5)
IMM GRANULOCYTES # BLD AUTO: 0.03 # — SIGNIFICANT CHANGE UP
IMM GRANULOCYTES NFR BLD AUTO: 0.5 % — SIGNIFICANT CHANGE UP (ref 0–1.5)
LYMPHOCYTES # BLD AUTO: 1.5 K/UL — SIGNIFICANT CHANGE UP (ref 1–3.3)
LYMPHOCYTES # BLD AUTO: 23.2 % — SIGNIFICANT CHANGE UP (ref 13–44)
MCHC RBC-ENTMCNC: 28.6 PG — SIGNIFICANT CHANGE UP (ref 27–34)
MCHC RBC-ENTMCNC: 32.8 % — SIGNIFICANT CHANGE UP (ref 32–36)
MCV RBC AUTO: 87.3 FL — SIGNIFICANT CHANGE UP (ref 80–100)
MONOCYTES # BLD AUTO: 0.55 K/UL — SIGNIFICANT CHANGE UP (ref 0–0.9)
MONOCYTES NFR BLD AUTO: 8.5 % — SIGNIFICANT CHANGE UP (ref 2–14)
NEUTROPHILS # BLD AUTO: 4.23 K/UL — SIGNIFICANT CHANGE UP (ref 1.8–7.4)
NEUTROPHILS NFR BLD AUTO: 65.5 % — SIGNIFICANT CHANGE UP (ref 43–77)
NRBC # FLD: 0 — SIGNIFICANT CHANGE UP
PLATELET # BLD AUTO: 234 K/UL — SIGNIFICANT CHANGE UP (ref 150–400)
PMV BLD: 9.8 FL — SIGNIFICANT CHANGE UP (ref 7–13)
POTASSIUM SERPL-MCNC: 4.1 MMOL/L — SIGNIFICANT CHANGE UP (ref 3.5–5.3)
POTASSIUM SERPL-SCNC: 4.1 MMOL/L — SIGNIFICANT CHANGE UP (ref 3.5–5.3)
RBC # BLD: 4.02 M/UL — SIGNIFICANT CHANGE UP (ref 3.8–5.2)
RBC # FLD: 12.8 % — SIGNIFICANT CHANGE UP (ref 10.3–14.5)
SODIUM SERPL-SCNC: 134 MMOL/L — LOW (ref 135–145)
T PALLIDUM AB TITR SER: NEGATIVE — SIGNIFICANT CHANGE UP
TSH SERPL-MCNC: 1.49 UIU/ML — SIGNIFICANT CHANGE UP (ref 0.27–4.2)
VIT B12 SERPL-MCNC: 640 PG/ML — SIGNIFICANT CHANGE UP (ref 200–900)
WBC # BLD: 6.46 K/UL — SIGNIFICANT CHANGE UP (ref 3.8–10.5)
WBC # FLD AUTO: 6.46 K/UL — SIGNIFICANT CHANGE UP (ref 3.8–10.5)

## 2018-10-10 PROCEDURE — 99232 SBSQ HOSP IP/OBS MODERATE 35: CPT

## 2018-10-10 PROCEDURE — 72131 CT LUMBAR SPINE W/O DYE: CPT | Mod: 26

## 2018-10-10 RX ADMIN — Medication 3 MILLIGRAM(S): at 22:33

## 2018-10-10 RX ADMIN — MIRTAZAPINE 15 MILLIGRAM(S): 45 TABLET, ORALLY DISINTEGRATING ORAL at 22:33

## 2018-10-10 RX ADMIN — Medication 650 MILLIGRAM(S): at 23:37

## 2018-10-10 RX ADMIN — CLOPIDOGREL BISULFATE 75 MILLIGRAM(S): 75 TABLET, FILM COATED ORAL at 13:19

## 2018-10-10 RX ADMIN — FAMOTIDINE 20 MILLIGRAM(S): 10 INJECTION INTRAVENOUS at 13:19

## 2018-10-10 RX ADMIN — Medication 1 TABLET(S): at 13:19

## 2018-10-10 RX ADMIN — Medication 650 MILLIGRAM(S): at 05:20

## 2018-10-10 RX ADMIN — LORATADINE 10 MILLIGRAM(S): 10 TABLET ORAL at 13:19

## 2018-10-10 RX ADMIN — HEPARIN SODIUM 5000 UNIT(S): 5000 INJECTION INTRAVENOUS; SUBCUTANEOUS at 18:47

## 2018-10-10 RX ADMIN — Medication 500 MILLIGRAM(S): at 13:19

## 2018-10-10 RX ADMIN — SENNA PLUS 2 TABLET(S): 8.6 TABLET ORAL at 22:38

## 2018-10-10 RX ADMIN — Medication 650 MILLIGRAM(S): at 13:20

## 2018-10-10 RX ADMIN — ATORVASTATIN CALCIUM 10 MILLIGRAM(S): 80 TABLET, FILM COATED ORAL at 22:33

## 2018-10-10 RX ADMIN — HEPARIN SODIUM 5000 UNIT(S): 5000 INJECTION INTRAVENOUS; SUBCUTANEOUS at 05:19

## 2018-10-10 RX ADMIN — Medication 650 MILLIGRAM(S): at 22:37

## 2018-10-10 RX ADMIN — Medication 650 MILLIGRAM(S): at 14:15

## 2018-10-10 RX ADMIN — Medication 25 MILLIGRAM(S): at 05:19

## 2018-10-10 RX ADMIN — Medication 650 MILLIGRAM(S): at 06:20

## 2018-10-10 RX ADMIN — Medication 25 MILLIGRAM(S): at 18:47

## 2018-10-10 NOTE — OCCUPATIONAL THERAPY INITIAL EVALUATION ADULT - PLANNED THERAPY INTERVENTIONS, OT EVAL
cognitive, visual perceptual/neuromuscular re-education/ROM/balance training/transfer training/ADL retraining/bed mobility training/fine motor coordination training/strengthening

## 2018-10-10 NOTE — DISCHARGE NOTE ADULT - PATIENT PORTAL LINK FT
You can access the [facility] Patient Portal, offered by [facility], by registering with the following website: [email or web address]

## 2018-10-10 NOTE — DISCHARGE NOTE ADULT - ADDITIONAL INSTRUCTIONS
PT - rehab  If you are in need of a general medicine physician and post-discharge medical follow-up for further care/recommendations you may contact the [facility] for an appointment [phone].

## 2018-10-10 NOTE — DISCHARGE NOTE ADULT - PLAN OF CARE
Pain control, weight bearing as tolerated, physical therapy X-rays were negative for fracture/dislocation. CT L spine showed --------------------------- You were seen by Physical Therapy and recommended for rehab. Continue with physical therapy sessions as recommended to improve in gait and activities of daily living. Stable. Monitor for signs/symptoms of infection, such as, fever/chills, burning/pain with urination, urinary frequency/hesitancy, cloudy urine, or blood in urine. Follow up outpatient PCP in 1-2 weeks for further management. Continue recommended medication regimen. Monitor for signs/symptoms of fluid overload and electrolyte abnormalities, such as, shortness of breath, cough, swelling, chest discomfort, changes in heart rate, dizziness, fainting, or changes in mental status. Follow-up with your PCP/cardiologist outpatient after you've been discharged from the hospital. X-rays were negative for fracture/dislocation. CT L spine showed moderate stenosis w/ r/l neural foraminal narrowing- You were seen by Physical Therapy and recommended for rehab. Continue with physical therapy sessions as recommended to improve in gait and activities of daily living. you did not have a UTI

## 2018-10-10 NOTE — PROGRESS NOTE ADULT - SUBJECTIVE AND OBJECTIVE BOX
Patient is a 89y old  Female who presents with a chief complaint of Fall (10 Oct 2018 15:51)      SUBJECTIVE / OVERNIGHT EVENTS: pt seen at 1p- pain in leg is better    MEDICATIONS  (STANDING):  acetaminophen   Tablet .. 650 milliGRAM(s) Oral every 8 hours  ascorbic acid 500 milliGRAM(s) Oral daily  atorvastatin 10 milliGRAM(s) Oral at bedtime  clopidogrel Tablet 75 milliGRAM(s) Oral daily  famotidine    Tablet 20 milliGRAM(s) Oral daily  heparin  Injectable 5000 Unit(s) SubCutaneous every 12 hours  loratadine 10 milliGRAM(s) Oral daily  metoprolol tartrate 25 milliGRAM(s) Oral two times a day  mirtazapine 15 milliGRAM(s) Oral at bedtime  multivitamin 1 Tablet(s) Oral daily  senna 2 Tablet(s) Oral at bedtime    MEDICATIONS  (PRN):  docusate sodium 100 milliGRAM(s) Oral three times a day PRN Constipation  melatonin 3 milliGRAM(s) Oral at bedtime PRN Insomnia      Meds ordered within last 24hours      T(C): 36.5 (10-10-18 @ 13:32), Max: 36.7 (10-09-18 @ 19:10)  HR: 86 (10-10-18 @ 13:32) (83 - 93)  BP: 142/71 (10-10-18 @ 13:32) (137/70 - 151/67)  RR: 18 (10-10-18 @ 13:32) (18 - 18)  SpO2: 97% (10-10-18 @ 13:32) (97% - 100%)    CAPILLARY BLOOD GLUCOSE        I&O's Summary      PHYSICAL EXAM:  GENERAL: NAD  CHEST/LUNG: Clear to auscultation bilaterally; No wheeze  HEART: Regular rate and rhythm; No murmurs, rubs, or gallops  ABDOMEN: Soft, Nontender, Nondistended; Bowel sounds present  EXTREMITIES:  No clubbing, cyanosis, or edema        LABS:                        11.5   6.46  )-----------( 234      ( 10 Oct 2018 05:05 )             35.1     10-10    134<L>  |  99  |  32<H>  ----------------------------<  105<H>  4.1   |  23  |  1.17    Ca    9.8      10 Oct 2018 05:05  Mg     2.7     10-09                RADIOLOGY & ADDITIONAL TESTS:    Imaging Personally Reviewed:    Consultant(s) Notes Reviewed:      Care Discussed with Consultants/Other Providers:

## 2018-10-10 NOTE — DISCHARGE NOTE ADULT - MEDICATION SUMMARY - MEDICATIONS TO STOP TAKING
I will STOP taking the medications listed below when I get home from the hospital:    zinc (as gluconate) 50 mg oral tablet    Bactrim  mg-160 mg oral tablet  -- 1 tab(s) by mouth 2 times a day   -- Avoid prolonged or excessive exposure to direct and/or artificial sunlight while taking this medication.  Finish all this medication unless otherwise directed by prescriber.  Medication should be taken with plenty of water.    furosemide 80 mg oral tablet  -- 1 tab(s) by mouth once a day

## 2018-10-10 NOTE — ED POST DISCHARGE NOTE - RESULT SUMMARY
culture grew 3 or more types of organims which indicate collection contamination, consider recollection only if clinically indicated. Patient discharged home with a prescription for Bactrim. Patient contact # [phone] [facility] spoke with Nurse Raymon discussed with her UCX results and that patient needs a repeat UCX. Raymon understands and will have patient repeat UA/UCX.

## 2018-10-10 NOTE — DISCHARGE NOTE ADULT - COMMUNITY RESOURCES
[facility] [address] [id number]; [phone] via [facility] [phone]

## 2018-10-10 NOTE — DISCHARGE NOTE ADULT - HOSPITAL COURSE
89 F PMHx HTN, PPM, diastolic CHF, depression and chronic ventral hernias presents from [facility] with multiple falls.  Pt had presented to ED yesterday with c/o abdominal pain with workup showing positive UA.  She was discharged back to her assisted living with a course of Bactrim.  Pt fell today while in bathroom, landing on knees.  She has had another prior fall this week as well.  Pt reports that she fell twice in the past 2 days but has not fallen prior to this.  Her only complaints at this time is pain in R hip, knee, and ankle that she states is chronic, and preceded these two falls. UA positive, S/P CTX in ED. CTH negative for acute changes. Orthostatics negative. X-rays negative for fractures/dislocations. CT L spine ------------------------------------------------. Pt seen by PT and recommended for rehab.     Chronic diastolic congestive heart failure.    -Lasix d'jonah 10/9   -BB  -Stable, F/U outpatient PCP    Osteoarthritis of multiple joints, unspecified osteoarthritis type.    -Tylenol ATC  x 3 days   -Stable, F/U outpatient PCP    Discharge to ------------------------------------------ 89 F PMHx HTN, PPM, diastolic CHF, depression and chronic ventral hernias presents from [facility] with multiple falls.  Pt had presented to ED yesterday with c/o abdominal pain with workup showing positive UA.  She was discharged back to her assisted living with a course of Bactrim.  Pt fell today while in bathroom, landing on knees.  She has had another prior fall this week as well.  Pt reports that she fell twice in the past 2 days but has not fallen prior to this.  Her only complaints at this time is pain in R hip, knee, and ankle that she states is chronic, and preceded these two falls. UA positive, S/P CTX in ED. CTH negative for acute changes. Orthostatics negative. X-rays negative for fractures/dislocations.< from: Xray Hip 2 Views, Bilateral (10.08.18 @ 14:34) >  P and frog-lateral views of both hips from 10/8/2018 at 1434. Compared   to appearance of the pelvis and hipson abdomen/pelvis CT from 4/4/2018.    IMPRESSION:  No fractures or dislocations.    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 Otherwise maintained joint spaces.    Advanced lower lumbar spine degenerative disease with curvature and pubic   symphysis degenerative change again apparent.    Generalized osteopenia again noted otherwise no discrete lytic or blastic   lesions.    < end of copied text >       CT L spine < from: CT Lumbar Spine No Cont (10.10.18 @ 16:52) >    L1-2: Bilateral hypertrophic facet joint changes are seen right greater   than left. Severe narrowing of the right neural foramen is seen.    L2-3: Disc osteophyte complex is seen with bilateral hypertrophic facet   joint changes seen. Moderate narrowing of the spinal canal and left   neural foramen is seen. Air is seen associated with the right facet joint.    L3-4: Disc osteophyte complex is seen with bilateral hypertrophic facet   changes. Moderate narrowing of the spinal canal is seen. Mild to moderate   narrowing of the spinal canal and left neural foramen is seen.    L4-5: Disc bulge and bilateral hypertrophic facet joint changes seen.   Moderate to severe narrowing of the spinal canal and moderate narrowing   of the left neural foramen.    L5-S1: Disc osteophyte complex and bilateral hypertrophic facet changes   are seen. Moderate narrowing of the spinal canal is seen. Severe   narrowing of the left neural foramen is seen.    Evaluation of the paraspinal soft tissues limited lack of IV contrast   though grossly unremarkable    Impression: Demineralization and degenerative changes as described above.    Lysis defects are seen involving the L4-5, L2-3 and L1-2 levels.    < end of copied text >  . Pt seen by PT and recommended for rehab.     Chronic diastolic congestive heart failure.    -Lasix d'jonah 10/9 - started on LOW DOSE lasix qod on d/c  -BB  -pt not orthostatic    Osteoarthritis of multiple joints, unspecified osteoarthritis type.    -Tylenol ATC    lidoderm added for pain to right leg  RLE 4/5 ?component of neuropathic pain    for dc to rehab

## 2018-10-10 NOTE — DISCHARGE NOTE ADULT - CARE PLAN
Principal Discharge DX:	Fall, initial encounter  Goal:	Pain control, weight bearing as tolerated, physical therapy  Assessment and plan of treatment:	X-rays were negative for fracture/dislocation. CT L spine showed --------------------------- You were seen by Physical Therapy and recommended for rehab. Continue with physical therapy sessions as recommended to improve in gait and activities of daily living.  Secondary Diagnosis:	Acute cystitis without hematuria  Assessment and plan of treatment:	Stable. Monitor for signs/symptoms of infection, such as, fever/chills, burning/pain with urination, urinary frequency/hesitancy, cloudy urine, or blood in urine. Follow up outpatient PCP in 1-2 weeks for further management.  Secondary Diagnosis:	Chronic diastolic congestive heart failure  Assessment and plan of treatment:	Continue recommended medication regimen. Monitor for signs/symptoms of fluid overload and electrolyte abnormalities, such as, shortness of breath, cough, swelling, chest discomfort, changes in heart rate, dizziness, fainting, or changes in mental status. Follow-up with your PCP/cardiologist outpatient after you've been discharged from the hospital. Principal Discharge DX:	Fall, initial encounter  Goal:	Pain control, weight bearing as tolerated, physical therapy  Assessment and plan of treatment:	X-rays were negative for fracture/dislocation. CT L spine showed moderate stenosis w/ r/l neural foraminal narrowing- You were seen by Physical Therapy and recommended for rehab. Continue with physical therapy sessions as recommended to improve in gait and activities of daily living.  Secondary Diagnosis:	Acute cystitis without hematuria  Assessment and plan of treatment:	you did not have a UTI  Secondary Diagnosis:	Chronic diastolic congestive heart failure  Assessment and plan of treatment:	Continue recommended medication regimen. Monitor for signs/symptoms of fluid overload and electrolyte abnormalities, such as, shortness of breath, cough, swelling, chest discomfort, changes in heart rate, dizziness, fainting, or changes in mental status. Follow-up with your PCP/cardiologist outpatient after you've been discharged from the hospital.

## 2018-10-10 NOTE — PROGRESS NOTE ADULT - ASSESSMENT
89 y/o F with diastolic CHF, HTN, PPM and depression p/w 2 falls in the past 2 days- urine cx w/ 3 organisms- pt w likely asymptomatic bacteuria.  Falls could be related to RLE weakness- CT lumbar spine r/o stenosis  for rehab 10/11

## 2018-10-10 NOTE — OCCUPATIONAL THERAPY INITIAL EVALUATION ADULT - PERTINENT HX OF CURRENT PROBLEM, REHAB EVAL
89 F PMHx HTN, PPM, diastolic CHF, depression and chronic ventral hernias presents from [facility] with multiple falls. CT Head: Volume loss and microvascular disease without evidence for acute hemorrhage or midline shift.

## 2018-10-10 NOTE — DISCHARGE NOTE ADULT - MEDICATION SUMMARY - MEDICATIONS TO TAKE
I will START or STAY ON the medications listed below when I get home from the hospital:    acetaminophen 325 mg oral tablet  -- 2 tab(s) by mouth every 8 hours  -- Indication: For Pain    mirtazapine  -- 15  by mouth once a day (at bedtime)  -- Indication: For depression    loratadine 10 mg oral tablet  -- 1 tab(s) by mouth once a day  -- Indication: For Allergies    lovastatin 40 mg oral tablet  -- 1 tab(s) by mouth once a day  -- Indication: For high cholesterol    clopidogrel 75 mg oral tablet  -- 1 tab(s) by mouth once a day  -- Indication: For CAD    metoprolol tartrate 25 mg oral tablet  -- 1 tab(s) by mouth 2 times a day  -- Indication: For Chronic diastolic congestive heart failure    Lasix 20 mg oral tablet  -- 1 tab(s) by mouth 3 times a week- monday, wednesday, friday  -- Indication: For Chronic diastolic congestive heart failure    famotidine 20 mg oral tablet  -- 1 tab(s) by mouth once a day  -- Indication: For GERD    docusate sodium 100 mg oral tablet  -- 1 tab(s) by mouth 3 times a day  -- Indication: For Constipation    senna oral tablet  -- 2 tab(s) by mouth once a day (at bedtime)  -- Indication: For Constipation    sodium chloride 1 g oral tablet  -- 1 tab(s) by mouth 2 times a day  stop after 5 days  -- Indication: For hyponatremia    Melatonin 3 mg oral tablet  -- 1 tab(s) by mouth once (at bedtime)  -- Indication: For insomnia    Calcium 600+D 600 mg-200 intl units oral tablet  -- 1 tab(s) by mouth once a day  -- Indication: For bone healht    Multiple Vitamins oral tablet  -- 1 tab(s) by mouth once a day  -- Indication: For bone health    Vitamin C 500 mg oral capsule  -- 1 cap(s) by mouth once a day  -- Indication: For health maintenance I will START or STAY ON the medications listed below when I get home from the hospital:    acetaminophen 325 mg oral tablet  -- 2 tab(s) by mouth every 8 hours  -- Indication: For Pain    mirtazapine  -- 15  by mouth once a day (at bedtime)  -- Indication: For depression    loratadine 10 mg oral tablet  -- 1 tab(s) by mouth once a day  -- Indication: For Allergies    lovastatin 40 mg oral tablet  -- 1 tab(s) by mouth once a day  -- Indication: For high cholesterol    clopidogrel 75 mg oral tablet  -- 1 tab(s) by mouth once a day  -- Indication: For CAD    metoprolol tartrate 25 mg oral tablet  -- 1 tab(s) by mouth 2 times a day  -- Indication: For Chronic diastolic congestive heart failure    lidocaine 5% topical film  -- Apply on skin to affected area once a day x 3 patches  -- Indication: For Osteoarthritis of multiple joints, unspecified osteoarthritis type    Lasix 20 mg oral tablet  -- 1 tab(s) by mouth 3 times a week- monday, wednesday, friday  -- Indication: For Chronic diastolic congestive heart failure    famotidine 20 mg oral tablet  -- 1 tab(s) by mouth once a day  -- Indication: For GERD    docusate sodium 100 mg oral tablet  -- 1 tab(s) by mouth 3 times a day  -- Indication: For Constipation    senna oral tablet  -- 2 tab(s) by mouth once a day (at bedtime)  -- Indication: For Constipation    sodium chloride 1 g oral tablet  -- 1 tab(s) by mouth 2 times a day  stop after 5 days  -- Indication: For hyponatremia    Melatonin 3 mg oral tablet  -- 1 tab(s) by mouth once (at bedtime)  -- Indication: For insomnia    Calcium 600+D 600 mg-200 intl units oral tablet  -- 1 tab(s) by mouth once a day  -- Indication: For bone healht    Multiple Vitamins oral tablet  -- 1 tab(s) by mouth once a day  -- Indication: For bone health    Vitamin C 500 mg oral capsule  -- 1 cap(s) by mouth once a day  -- Indication: For health maintenance

## 2018-10-11 VITALS
OXYGEN SATURATION: 100 % | HEART RATE: 80 BPM | RESPIRATION RATE: 18 BRPM | TEMPERATURE: 97 F | SYSTOLIC BLOOD PRESSURE: 125 MMHG | DIASTOLIC BLOOD PRESSURE: 59 MMHG

## 2018-10-11 LAB
BASOPHILS # BLD AUTO: 0.05 K/UL — SIGNIFICANT CHANGE UP (ref 0–0.2)
BASOPHILS NFR BLD AUTO: 0.9 % — SIGNIFICANT CHANGE UP (ref 0–2)
BUN SERPL-MCNC: 25 MG/DL — HIGH (ref 7–23)
CALCIUM SERPL-MCNC: 9.1 MG/DL — SIGNIFICANT CHANGE UP (ref 8.4–10.5)
CHLORIDE SERPL-SCNC: 99 MMOL/L — SIGNIFICANT CHANGE UP (ref 98–107)
CO2 SERPL-SCNC: 21 MMOL/L — LOW (ref 22–31)
CREAT SERPL-MCNC: 1 MG/DL — SIGNIFICANT CHANGE UP (ref 0.5–1.3)
EOSINOPHIL # BLD AUTO: 0.19 K/UL — SIGNIFICANT CHANGE UP (ref 0–0.5)
EOSINOPHIL NFR BLD AUTO: 3.3 % — SIGNIFICANT CHANGE UP (ref 0–6)
GLUCOSE SERPL-MCNC: 105 MG/DL — HIGH (ref 70–99)
HCT VFR BLD CALC: 37.1 % — SIGNIFICANT CHANGE UP (ref 34.5–45)
HGB BLD-MCNC: 11.9 G/DL — SIGNIFICANT CHANGE UP (ref 11.5–15.5)
IMM GRANULOCYTES # BLD AUTO: 0.02 # — SIGNIFICANT CHANGE UP
IMM GRANULOCYTES NFR BLD AUTO: 0.3 % — SIGNIFICANT CHANGE UP (ref 0–1.5)
LYMPHOCYTES # BLD AUTO: 1.55 K/UL — SIGNIFICANT CHANGE UP (ref 1–3.3)
LYMPHOCYTES # BLD AUTO: 26.9 % — SIGNIFICANT CHANGE UP (ref 13–44)
MCHC RBC-ENTMCNC: 28.3 PG — SIGNIFICANT CHANGE UP (ref 27–34)
MCHC RBC-ENTMCNC: 32.1 % — SIGNIFICANT CHANGE UP (ref 32–36)
MCV RBC AUTO: 88.3 FL — SIGNIFICANT CHANGE UP (ref 80–100)
MONOCYTES # BLD AUTO: 0.6 K/UL — SIGNIFICANT CHANGE UP (ref 0–0.9)
MONOCYTES NFR BLD AUTO: 10.4 % — SIGNIFICANT CHANGE UP (ref 2–14)
NEUTROPHILS # BLD AUTO: 3.35 K/UL — SIGNIFICANT CHANGE UP (ref 1.8–7.4)
NEUTROPHILS NFR BLD AUTO: 58.2 % — SIGNIFICANT CHANGE UP (ref 43–77)
NRBC # FLD: 0 — SIGNIFICANT CHANGE UP
PLATELET # BLD AUTO: 240 K/UL — SIGNIFICANT CHANGE UP (ref 150–400)
PMV BLD: 9.6 FL — SIGNIFICANT CHANGE UP (ref 7–13)
POTASSIUM SERPL-MCNC: 4.6 MMOL/L — SIGNIFICANT CHANGE UP (ref 3.5–5.3)
POTASSIUM SERPL-SCNC: 4.6 MMOL/L — SIGNIFICANT CHANGE UP (ref 3.5–5.3)
RBC # BLD: 4.2 M/UL — SIGNIFICANT CHANGE UP (ref 3.8–5.2)
RBC # FLD: 12.9 % — SIGNIFICANT CHANGE UP (ref 10.3–14.5)
SODIUM SERPL-SCNC: 132 MMOL/L — LOW (ref 135–145)
WBC # BLD: 5.76 K/UL — SIGNIFICANT CHANGE UP (ref 3.8–10.5)
WBC # FLD AUTO: 5.76 K/UL — SIGNIFICANT CHANGE UP (ref 3.8–10.5)

## 2018-10-11 PROCEDURE — 99232 SBSQ HOSP IP/OBS MODERATE 35: CPT | Mod: GC

## 2018-10-11 PROCEDURE — 99233 SBSQ HOSP IP/OBS HIGH 50: CPT

## 2018-10-11 RX ORDER — METOPROLOL TARTRATE 50 MG
1 TABLET ORAL
Qty: 0 | Refills: 0 | COMMUNITY

## 2018-10-11 RX ORDER — LIDOCAINE 4 G/100G
1 CREAM TOPICAL
Qty: 0 | Refills: 0 | COMMUNITY
Start: 2018-10-11

## 2018-10-11 RX ORDER — LIDOCAINE 4 G/100G
1 CREAM TOPICAL EVERY 24 HOURS
Qty: 0 | Refills: 0 | Status: DISCONTINUED | OUTPATIENT
Start: 2018-10-11 | End: 2018-10-11

## 2018-10-11 RX ORDER — ACETAMINOPHEN 500 MG
2 TABLET ORAL
Qty: 0 | Refills: 0 | COMMUNITY

## 2018-10-11 RX ORDER — MULTIVIT-MIN/FERROUS GLUCONATE 9 MG/15 ML
1 LIQUID (ML) ORAL
Qty: 0 | Refills: 0 | COMMUNITY

## 2018-10-11 RX ORDER — FUROSEMIDE 40 MG
1 TABLET ORAL
Qty: 0 | Refills: 0 | COMMUNITY

## 2018-10-11 RX ORDER — ZINC GLUCONATE 30 MG
0 TABLET ORAL
Qty: 0 | Refills: 0 | COMMUNITY

## 2018-10-11 RX ORDER — METOPROLOL TARTRATE 50 MG
1 TABLET ORAL
Qty: 0 | Refills: 0 | COMMUNITY
Start: 2018-10-11

## 2018-10-11 RX ORDER — SENNA PLUS 8.6 MG/1
1 TABLET ORAL
Qty: 0 | Refills: 0 | COMMUNITY

## 2018-10-11 RX ORDER — SODIUM CHLORIDE 9 MG/ML
1 INJECTION INTRAMUSCULAR; INTRAVENOUS; SUBCUTANEOUS
Qty: 0 | Refills: 0 | COMMUNITY
Start: 2018-10-11

## 2018-10-11 RX ORDER — ACETAMINOPHEN 500 MG
2 TABLET ORAL
Qty: 0 | Refills: 0 | COMMUNITY
Start: 2018-10-11

## 2018-10-11 RX ORDER — SODIUM CHLORIDE 9 MG/ML
1 INJECTION INTRAMUSCULAR; INTRAVENOUS; SUBCUTANEOUS
Qty: 0 | Refills: 0 | Status: DISCONTINUED | OUTPATIENT
Start: 2018-10-11 | End: 2018-10-11

## 2018-10-11 RX ORDER — SENNA PLUS 8.6 MG/1
2 TABLET ORAL
Qty: 0 | Refills: 0 | COMMUNITY
Start: 2018-10-11

## 2018-10-11 RX ADMIN — Medication 500 MILLIGRAM(S): at 12:41

## 2018-10-11 RX ADMIN — HEPARIN SODIUM 5000 UNIT(S): 5000 INJECTION INTRAVENOUS; SUBCUTANEOUS at 05:30

## 2018-10-11 RX ADMIN — FAMOTIDINE 20 MILLIGRAM(S): 10 INJECTION INTRAVENOUS at 12:40

## 2018-10-11 RX ADMIN — Medication 650 MILLIGRAM(S): at 05:29

## 2018-10-11 RX ADMIN — CLOPIDOGREL BISULFATE 75 MILLIGRAM(S): 75 TABLET, FILM COATED ORAL at 12:42

## 2018-10-11 RX ADMIN — Medication 1 TABLET(S): at 12:40

## 2018-10-11 RX ADMIN — Medication 650 MILLIGRAM(S): at 12:43

## 2018-10-11 RX ADMIN — Medication 650 MILLIGRAM(S): at 06:29

## 2018-10-11 RX ADMIN — Medication 650 MILLIGRAM(S): at 13:39

## 2018-10-11 RX ADMIN — Medication 25 MILLIGRAM(S): at 05:30

## 2018-10-11 RX ADMIN — LORATADINE 10 MILLIGRAM(S): 10 TABLET ORAL at 12:41

## 2018-10-11 NOTE — PROGRESS NOTE ADULT - SUBJECTIVE AND OBJECTIVE BOX
HPI:  89 year-old woman  with HTN, PPM, diastolic CHF, depression and chronic ventral hernias presents from [facility] with multiple falls.  Pt had presented to ER with c/o abdominal pain with workup showing positive UA.  She was discharged back to her assisted living with a course of Bactrim.  Pt fell today while in bathroom, landing on knees.  She has had another prior fall this week as well.  Pt reports that she fell twice in the past 2 days but has not fallen prior to this.  Her only complaints at this time is pain in Right hip, knee, and ankle that she states is chronic, and preceded these two falls.    knee pain is better. participated with OT     REVIEW OF SYSTEMS  Constitutional: No fever  HEENT: + hard of hearing   Pulm: No cough, No SOB  Cardio: No chest pain, No palpitations  GI:  No abdominal pain  : No dysuria  Neuro:  +loss of strength in legs, No sensation changes   Skin: No itching, No rash, No lesions   Endo: No Diabetic symptoms, No Thyroid symptoms  MSK: +joint pain, +joint swelling, No muscle pain   Psych:  No depression, No anxiety        CURRENT FUNCTIONAL STATUS min a     - ADLs: needs eval     MEDICATIONS  (STANDING):  acetaminophen   Tablet .. 650 milliGRAM(s) Oral every 8 hours  ascorbic acid 500 milliGRAM(s) Oral daily  atorvastatin 10 milliGRAM(s) Oral at bedtime  clopidogrel Tablet 75 milliGRAM(s) Oral daily  famotidine    Tablet 20 milliGRAM(s) Oral daily  heparin  Injectable 5000 Unit(s) SubCutaneous every 12 hours  lidocaine   Patch 1 Patch Transdermal every 24 hours  lidocaine   Patch 1 Patch Transdermal every 24 hours  lidocaine   Patch 1 Patch Transdermal every 24 hours  loratadine 10 milliGRAM(s) Oral daily  metoprolol tartrate 25 milliGRAM(s) Oral two times a day  mirtazapine 15 milliGRAM(s) Oral at bedtime  multivitamin 1 Tablet(s) Oral daily  senna 2 Tablet(s) Oral at bedtime  sodium chloride 1 Gram(s) Oral two times a day    MEDICATIONS  (PRN):  docusate sodium 100 milliGRAM(s) Oral three times a day PRN Constipation  melatonin 3 milliGRAM(s) Oral at bedtime PRN Insomnia    Vital Signs Last 24 Hrs  T(C): 36.2 (11 Oct 2018 12:33), Max: 36.4 (10 Oct 2018 21:11)  T(F): 97.2 (11 Oct 2018 12:33), Max: 97.5 (10 Oct 2018 21:11)  HR: 80 (11 Oct 2018 12:33) (80 - 96)  BP: 125/59 (11 Oct 2018 12:33) (125/59 - 151/72)  BP(mean): --  RR: 18 (11 Oct 2018 12:33) (18 - 18)  SpO2: 100% (11 Oct 2018 12:33) (98% - 100%)      ----------------------------------------------------------------------------------------        PHYSICAL EXAM  Constitutional: NAD, Resting Comfortable  HEENT: NC/AT, Normal Conjunctivae, EOMI, PERRLA   Neck: Supple, FROM,   Chest: No Respiratory Distress,  Lungs CTAB, No Rales/Rhonchi/Wheezing  CV: RRR, Normal S1-S2, No Murmurs/Gallops/Rubs, No Peripheral Edema  Abdomen: ND/NT, Soft, BS+  MSK: +joint swelling on the right knee   Ext: No C/C/E, Pulses 2+ throughout, No calf tenderness   Neuro Exam      Cognitive: AAO x 3     Communication:  Fluent, No dysarthria                  LEFT    UE:  SF 5/5, EF 5/5, EE 5/5, WE 5/5, Hand intrinsic 5/5,  wnl            RIGHT UE:  SF 5/5, EF 5/5, EE 5/5, WE 5/5, Hand Intrinsic 5/5,  wnl             LEFT    LE:  HF 4/5, KE 4/5, DF 5/5, EHL 5/5,  PF 5/5             RIGHT LE:  HF 3+/5, KE 4/5, DF 5/5, EHL 5/5, PF 5/5        Sensory: Intact to light touch     Tone: Normal  Psychiatric: Affect WNL  Functional:     ASSESSMENT/PLAN  89 year-old woman  with HTN, PPM, diastolic CHF, depression and chronic ventral hernias presents with multiple falls it is likely multi-factorial, falls not likely contributed to by solely spinal stenosis in absence of back pain and numbness/tingling in extremities.     recurrent falls likely multifactorial   knee pain is better  Continue Tylenol  Dispo- ANDERS

## 2018-10-11 NOTE — CHART NOTE - NSCHARTNOTEFT_GEN_A_CORE
pt seen and examined at 1130am- felt that she had some pain in right leg- otherwise okay for rehab  see dc note  time 40min   for dc to rehab

## 2018-10-21 ENCOUNTER — EMERGENCY (EMERGENCY)
Facility: HOSPITAL | Age: 83
LOS: 1 days | Discharge: HOME | End: 2018-10-21
Attending: EMERGENCY MEDICINE
Payer: MEDICARE

## 2018-10-21 VITALS
SYSTOLIC BLOOD PRESSURE: 134 MMHG | HEART RATE: 86 BPM | RESPIRATION RATE: 20 BRPM | TEMPERATURE: 97 F | DIASTOLIC BLOOD PRESSURE: 54 MMHG | OXYGEN SATURATION: 99 %

## 2018-10-21 VITALS
SYSTOLIC BLOOD PRESSURE: 108 MMHG | RESPIRATION RATE: 18 BRPM | DIASTOLIC BLOOD PRESSURE: 48 MMHG | OXYGEN SATURATION: 99 % | HEART RATE: 100 BPM

## 2018-10-21 LAB
ANION GAP SERPL CALC-SCNC: 6 MMOL/L — SIGNIFICANT CHANGE UP (ref 5–17)
APTT BLD: 38.6 SEC — HIGH (ref 27.5–37.4)
BASOPHILS # BLD AUTO: 0.1 K/UL — SIGNIFICANT CHANGE UP (ref 0–0.2)
BASOPHILS NFR BLD AUTO: 1 % — SIGNIFICANT CHANGE UP (ref 0–2)
BUN SERPL-MCNC: 25 MG/DL — HIGH (ref 7–18)
CALCIUM SERPL-MCNC: 10 MG/DL — SIGNIFICANT CHANGE UP (ref 8.4–10.5)
CHLORIDE SERPL-SCNC: 103 MMOL/L — SIGNIFICANT CHANGE UP (ref 96–108)
CO2 SERPL-SCNC: 28 MMOL/L — SIGNIFICANT CHANGE UP (ref 22–31)
CREAT SERPL-MCNC: 0.96 MG/DL — SIGNIFICANT CHANGE UP (ref 0.5–1.3)
EOSINOPHIL # BLD AUTO: 0.1 K/UL — SIGNIFICANT CHANGE UP (ref 0–0.5)
EOSINOPHIL NFR BLD AUTO: 2.2 % — SIGNIFICANT CHANGE UP (ref 0–6)
GLUCOSE SERPL-MCNC: 70 MG/DL — SIGNIFICANT CHANGE UP (ref 70–99)
HCT VFR BLD CALC: 37.7 % — SIGNIFICANT CHANGE UP (ref 34.5–45)
HGB BLD-MCNC: 12.1 G/DL — SIGNIFICANT CHANGE UP (ref 11.5–15.5)
INR BLD: 0.99 RATIO — SIGNIFICANT CHANGE UP (ref 0.88–1.16)
LYMPHOCYTES # BLD AUTO: 1.4 K/UL — SIGNIFICANT CHANGE UP (ref 1–3.3)
LYMPHOCYTES # BLD AUTO: 22.4 % — SIGNIFICANT CHANGE UP (ref 13–44)
MCHC RBC-ENTMCNC: 28.1 PG — SIGNIFICANT CHANGE UP (ref 27–34)
MCHC RBC-ENTMCNC: 32 GM/DL — SIGNIFICANT CHANGE UP (ref 32–36)
MCV RBC AUTO: 87.8 FL — SIGNIFICANT CHANGE UP (ref 80–100)
MONOCYTES # BLD AUTO: 0.7 K/UL — SIGNIFICANT CHANGE UP (ref 0–0.9)
MONOCYTES NFR BLD AUTO: 11.2 % — SIGNIFICANT CHANGE UP (ref 2–14)
NEUTROPHILS # BLD AUTO: 4.1 K/UL — SIGNIFICANT CHANGE UP (ref 1.8–7.4)
NEUTROPHILS NFR BLD AUTO: 63.3 % — SIGNIFICANT CHANGE UP (ref 43–77)
PLATELET # BLD AUTO: 244 K/UL — SIGNIFICANT CHANGE UP (ref 150–400)
POTASSIUM SERPL-MCNC: 4 MMOL/L — SIGNIFICANT CHANGE UP (ref 3.5–5.3)
POTASSIUM SERPL-SCNC: 4 MMOL/L — SIGNIFICANT CHANGE UP (ref 3.5–5.3)
PROTHROM AB SERPL-ACNC: 10.8 SEC — SIGNIFICANT CHANGE UP (ref 9.8–12.7)
RBC # BLD: 4.3 M/UL — SIGNIFICANT CHANGE UP (ref 3.8–5.2)
RBC # FLD: 13.5 % — SIGNIFICANT CHANGE UP (ref 10.3–14.5)
SODIUM SERPL-SCNC: 137 MMOL/L — SIGNIFICANT CHANGE UP (ref 135–145)
WBC # BLD: 6.5 K/UL — SIGNIFICANT CHANGE UP (ref 3.8–10.5)
WBC # FLD AUTO: 6.5 K/UL — SIGNIFICANT CHANGE UP (ref 3.8–10.5)

## 2018-10-21 PROCEDURE — 73620 X-RAY EXAM OF FOOT: CPT | Mod: 26,RT

## 2018-10-21 PROCEDURE — 73562 X-RAY EXAM OF KNEE 3: CPT

## 2018-10-21 PROCEDURE — 85027 COMPLETE CBC AUTOMATED: CPT

## 2018-10-21 PROCEDURE — 99283 EMERGENCY DEPT VISIT LOW MDM: CPT

## 2018-10-21 PROCEDURE — 85730 THROMBOPLASTIN TIME PARTIAL: CPT

## 2018-10-21 PROCEDURE — 73620 X-RAY EXAM OF FOOT: CPT

## 2018-10-21 PROCEDURE — 99284 EMERGENCY DEPT VISIT MOD MDM: CPT | Mod: 25

## 2018-10-21 PROCEDURE — 85610 PROTHROMBIN TIME: CPT

## 2018-10-21 PROCEDURE — 96374 THER/PROPH/DIAG INJ IV PUSH: CPT

## 2018-10-21 PROCEDURE — 73590 X-RAY EXAM OF LOWER LEG: CPT | Mod: 26,RT

## 2018-10-21 PROCEDURE — 80048 BASIC METABOLIC PNL TOTAL CA: CPT

## 2018-10-21 PROCEDURE — 73562 X-RAY EXAM OF KNEE 3: CPT | Mod: 26,RT

## 2018-10-21 PROCEDURE — 73590 X-RAY EXAM OF LOWER LEG: CPT

## 2018-10-21 RX ORDER — ACETAMINOPHEN 500 MG
1000 TABLET ORAL ONCE
Qty: 0 | Refills: 0 | Status: COMPLETED | OUTPATIENT
Start: 2018-10-21 | End: 2018-10-21

## 2018-10-21 RX ADMIN — Medication 400 MILLIGRAM(S): at 10:51

## 2018-10-21 NOTE — ED ADULT NURSE NOTE - NSIMPLEMENTINTERV_GEN_ALL_ED
Implemented All Fall with Harm Risk Interventions:  [employer] to call system. Call bell, personal items and telephone within reach. Instruct patient to call for assistance. Room bathroom lighting operational. Non-slip footwear when patient is off stretcher. Physically safe environment: no spills, clutter or unnecessary equipment. Stretcher in lowest position, wheels locked, appropriate side rails in place. Provide visual cue, wrist band, yellow gown, etc. Monitor gait and stability. Monitor for mental status changes and reorient to person, place, and time. Review medications for side effects contributing to fall risk. Reinforce activity limits and safety measures with patient and family. Provide visual clues: red socks.

## 2018-10-21 NOTE — ED PROVIDER NOTE - OBJECTIVE STATEMENT
89 y/o F pt with a PMHx of CHF, COPD, GERD, HTN and a significant PSHx of appendectomy presents to the ED with complaints of right foot pain s/p fall yesterday. Patient reports a mechanical fall from the toilet witnessed by the nursing home personnel. Patient states she slipped and fell but was assisted to the ground by her nursing home personnel. Patient notes she got an x-ray done immediately; however, there were no obvious signs of fracture but the x-ray was considered to be poor film. Patient had worsening pain and swelling over night. Patient is presenting today with continuous foot pain, chronic wounds along the distal portion of the right lower extremity and bruising underneath each of her toes. Patient notes when slipping, she hyperextended all of her toes of the right foot. Patient normally ambulates with walker but is unable to do so after her fall. Patient is on Plavix and ASA. Patient reports she did not strike her head and denies LOC because she was assisted to the ground while slipping.

## 2018-10-21 NOTE — ED PROVIDER NOTE - MUSCULOSKELETAL, MLM
Pain with palpation throughout the right foot, pain with palpation of each individual digit and intact sensation, effusion and swelling of right knee but no tenderness or pain, mid shaft tenderness of right tibia.

## 2018-10-21 NOTE — ED ADULT NURSE NOTE - OBJECTIVE STATEMENT
Pt aox1, BIB from [facility], s/p mechanical fall yesterday, pt normally walks with a walker. p/w RLL swelling. Right foot swelling bruising under toes 1, 2 and 5, abrasion between and under toes 4-5, abrasions to right shin. Strong pulses noted on right foot. Sent from nursing home to r/o fracture of right leg.

## 2018-10-21 NOTE — ED PROVIDER NOTE - PROGRESS NOTE DETAILS
Results of imaging shows acute 5th toe fracture with age indeterminate 3rd/4th toe fractures. Patient reports history of fracture of these toes. However she does have acute tenderness. Will place with post op shoe. Patient is weight bearing as tolerated. Recommend follow up with podiatry. Patient's PCP notified. Will D/C back to nursing facility.

## 2018-10-21 NOTE — ED PROVIDER NOTE - MEDICAL DECISION MAKING DETAILS
89 y/o F pt presenting with 1 day after fall. Will obtain x-ray or right knee, right tib fib, right foot, basic blood work and reassess.

## 2018-10-21 NOTE — ED PROVIDER NOTE - SKIN, MLM
Soft tissue swelling of right ankle and foot, good capillary refill, good peripheral pulses, bruising along the distal portion of the foot.

## 2018-11-01 NOTE — DISCHARGE NOTE ADULT - FUNCTIONAL SCREEN CURRENT LEVEL: BATHING, MLM
Procedure type:    __x___ultrasound guided _____stereotactic    Breast:    __x___Left _____Right    Location: 9:00 periareolar    Needle: 12g parish    # of passes: 3 with calcs    Clip: hydromark butterfly    Performed by: Dr Ashu Magdaleno held for 5 minutes by: Sobeida Romo    Sterbhakti Strips:    __x___yes _____no    Band aid:    __x___yes_____no    Tape and guaze:    _____yes ___x__no    Tolerated procedure:    __x___yes _____no 2 = assistive person

## 2020-09-18 ENCOUNTER — EMERGENCY (EMERGENCY)
Facility: HOSPITAL | Age: 85
LOS: 1 days | Discharge: NURSING, REHAB OR LONG-TERM CARE | End: 2020-09-18
Attending: STUDENT IN AN ORGANIZED HEALTH CARE EDUCATION/TRAINING PROGRAM | Admitting: STUDENT IN AN ORGANIZED HEALTH CARE EDUCATION/TRAINING PROGRAM
Payer: MEDICARE

## 2020-09-18 VITALS
SYSTOLIC BLOOD PRESSURE: 129 MMHG | TEMPERATURE: 98 F | OXYGEN SATURATION: 100 % | HEART RATE: 80 BPM | DIASTOLIC BLOOD PRESSURE: 51 MMHG | HEIGHT: 64 IN | RESPIRATION RATE: 18 BRPM

## 2020-09-18 VITALS
OXYGEN SATURATION: 98 % | HEART RATE: 87 BPM | TEMPERATURE: 98 F | SYSTOLIC BLOOD PRESSURE: 131 MMHG | RESPIRATION RATE: 16 BRPM | DIASTOLIC BLOOD PRESSURE: 60 MMHG

## 2020-09-18 PROCEDURE — 72125 CT NECK SPINE W/O DYE: CPT | Mod: 26

## 2020-09-18 PROCEDURE — 72128 CT CHEST SPINE W/O DYE: CPT | Mod: 26

## 2020-09-18 PROCEDURE — 99284 EMERGENCY DEPT VISIT MOD MDM: CPT | Mod: GC

## 2020-09-18 PROCEDURE — 70450 CT HEAD/BRAIN W/O DYE: CPT | Mod: 26

## 2020-09-18 RX ORDER — ACETAMINOPHEN 500 MG
975 TABLET ORAL ONCE
Refills: 0 | Status: COMPLETED | OUTPATIENT
Start: 2020-09-18 | End: 2020-09-18

## 2020-09-18 RX ORDER — ACETAMINOPHEN 500 MG
650 TABLET ORAL ONCE
Refills: 0 | Status: COMPLETED | OUTPATIENT
Start: 2020-09-18 | End: 2020-09-18

## 2020-09-18 RX ADMIN — Medication 650 MILLIGRAM(S): at 16:51

## 2020-09-18 NOTE — ED PROVIDER NOTE - NS ED ROS FT
CONSTITUTIONAL: No fevers, chills, fatigue, weakness, dizziness  HEENT: No loss of vision, double vision, blurry vision, photophobia, phonophobia, ear drainage, nasal congestion, runny nose, sore throat, jaw pain  GI: No nausea, vomiting  CARDIAC: No chest pain  PULM: No cough, shortness of breath  SKIN: No rashes  MSK: NECK PAIN. No neck stiffness  NEURO: No current headache, paresthesias

## 2020-09-18 NOTE — ED PROVIDER NOTE - PROGRESS NOTE DETAILS
Jose Ramon Boateng D.O., PGY2 (Resident)  CTs negative. Patient tolerating PO. Pain improved. Still w/o tenderness of midline or paraspinal tenderness. Full range of motion of neck. Patient able to stand and sit on comode. SW called for transport.

## 2020-09-18 NOTE — ED PROVIDER NOTE - NSFOLLOWUPINSTRUCTIONS_ED_ALL_ED_FT
YOU WERE SEEN IN THE ED FOR: neck pain    YOUR CT SCANS DID NOT SHOW EMERGENT FINDINGS.    FOR PAIN/FEVER, YOU MAY TAKE TYLENOL (acetaminophen). FOLLOW THE INSTRUCTIONS ON THE LABEL/CONTAINER.    PLEASE FOLLOW UP WITH YOUR PRIVATE PHYSICIAN WITHIN THE NEXT 72 HOURS. BRING COPIES OF YOUR RESULTS.    RETURN TO THE EMERGENCY DEPARTMENT IF YOU EXPERIENCE ANY NEW/CONCERNING/WORSENING SYMPTOMS.

## 2020-09-18 NOTE — ED PROVIDER NOTE - ATTENDING CONTRIBUTION TO CARE
Edd PIPER: I agree with the above provided history and exam and addend/modify it as follows.    89 y.o. female hx of HTN, PPM, diastolic CHF, depression and OA, presents to the ED from [facility] home for eval for cervical compression fracture (as per nurse Radha). Patient hard of hearing. Hx gotten from Nurse Radha. Patient ambulates w/ a walker at baseline, has chronic LE swelling on lasix, stubborn, refuses to wear compression stockings. Today, patient complained of sharp neck pain, a mild headache improved w/ tylenol. Patient currently in room denies any complaints, and denies whether she had any pain at all earlier today.     Exam grossly normal (including neuro), although limited compliance 2/2 patient's difficulty hearing - moving all extremities spontaneously, w/out any focal TTP or bony deformity.     Will check CT to assess for compression fracture although appears low risk w/out focal neuro deficits, will likely dc back to facility if results wnl    I Juan Puga MD performed a history and physical exam of the patient and discussed their management with the resident and /or advanced care provider. I reviewed the resident and /or ACP's note and agree with the documented findings and plan of care. My medical decision making and observations are found above.

## 2020-09-18 NOTE — ED PROVIDER NOTE - CHIEF COMPLAINT
The patient is a 89y Female complaining of Bladder non-tender and non-distended. Urine clear yellow.

## 2020-09-18 NOTE — ED PROVIDER NOTE - OBJECTIVE STATEMENT
89 y.o. female    Radha (nurse from [facility]): [phone]  [facility] main #: [phone] 89 y.o. female hx of HTN, PPM, diastolic CHF, depression and OA, presents to the ED from [facility] for eval for cervical compression fracture (as per nurse Radha). Patient hard of hearing. Hx gotten from Nurse Radha. Patient ambulates w/ a walker at baseline, has chronic LE swelling on lasix, stubborn, refuses to wear compression stockings. Today, patient complained of sharp neck pain, a mild headache improved w/ tylenol and a lidoderm patch. When nursing staff at the NH tried to ambulate the patient, she said she feels too much pain in her neck and could not walk thus was sent in for eval. Here in ED, patient denies headache. Endorses some neck pain. Denies visual changes, chest pain, shortness of breath, paresthesias. States "I don't want to use a bed pan, I want to walk to the bathroom and use it".    Radha (nurse from [facility]): [phone]  [facility] main #: [phone]

## 2020-09-18 NOTE — ED PROVIDER NOTE - PHYSICAL EXAMINATION
GENERAL: elderly female, lying in bed, NAD, EXTREMELY HARD OF HEARING. Vital signs are within normal limits  HEENT: NC/AT, conjunctiva noninjected, sclera anicteric, PERRL, EOMI, moist mucous membranes  NECK: Supple, trachea midline, full range of motion, nontender to palpation, no carotid bruits  LUNG: Nonlabored respirations, no wheezes  CV: RRR, systolic murmur, Pulses- Radial: 2+ bilateral and equal  ABDOMEN: Nondistended  MSK: No visible deformities. no midline or paraspinal tenderness. Nontender pelvis, femoral heads, chest wall. Full range of motion of UE/LE bilateral. LE edema pitting 3+, nonweeping.  SKIN: No wounds  NEURO: AAOx3 (to person, place, time). no tremor, sensation grossly intact  PSYCH: Normal mood and affect

## 2020-09-18 NOTE — ED ADULT TRIAGE NOTE - CHIEF COMPLAINT QUOTE
Pt extremely [place], coming from nursing home c/o neck pain and headache as per facility. Pt breathing even and unlabored, afebrile. PMH: CHF

## 2020-09-18 NOTE — ED ADULT NURSE NOTE - OBJECTIVE STATEMENT
Pt presenting to room 11 AxOx3 sent in from nursing home with c/o neck pain, back pain. PMH CHF, COPD. On arrival to ED pt's breathing is even and unlabored. Palor/diaphoresis not noted. Pt denies CP, SOB, N/V, SOB. pt refusing to get undressed, refusing vital signs on arrival to room. Pt medicated for symptoms. MD at bedside, will continue to monitor.

## 2020-09-18 NOTE — ED PROVIDER NOTE - CLINICAL SUMMARY MEDICAL DECISION MAKING FREE TEXT BOX
89 y.o. female hx of OA sent in from nursing home due to neck pain, eval for compression fractures. Unknown if any falls. Patient was able to stand at NH today. Vitals wnl. Exam w/o midline spinal tenderness, signs of trauma. Will check CTH, c spine-T spine, analgesia, reassess.

## 2020-09-18 NOTE — ED PROVIDER NOTE - PSH
H/O: hysterectomy    Hx of appendectomy    s/p exploratory laparotomy for SBO x 2  2000 and in 2002

## 2020-09-18 NOTE — ED PROVIDER NOTE - PATIENT PORTAL LINK FT
You can access the FollowMyHealth Patient Portal offered by [facility] by registering at the following website: [email or web address]. By joining [facility]’s FollowMyHealth portal, you will also be able to view your health information using other applications (apps) compatible with our system.

## 2020-09-18 NOTE — PROVIDER CONTACT NOTE (OTHER) - ASSESSMENT
Medical team referred this case as pt needs a ride to her facility. Case was discussed with the medical team informed that pt needs ambulance due to pt's medical conditions .  Non Emergent ambulance form has been signed and has been attached to the pt's envelope for EMS.  [facility] [phone]  spoke with Tameka  to set up ambulance service and pt will be picked up around 9 pm trip # [id number].   Medical team informed they have contacted [facility] and report was given on pts return.  [facility] has Assisted Living [phone] [phone]. No further SW intervention needed for this visit.

## 2020-09-18 NOTE — ED ADULT NURSE NOTE - CHIEF COMPLAINT QUOTE
Pt extremely [place], coming from nursing home c/o neck pain and headache as per facility. Pt breathing even and unlabored, afebrile. PMH: CHF

## 2020-09-18 NOTE — PROVIDER CONTACT NOTE (OTHER) - ASSESSMENT
I spoke with Luanne  at [facility] at [phone] and was told they do not need Covid Test prior to return to Assisted Living.

## 2020-12-08 NOTE — PATIENT PROFILE ADULT - NSPROCHRONICPAINRELIEVE_GEN_A_NUR
rest Propranolol Pregnancy And Lactation Text: This medication is Pregnancy Category C and it isn't known if it is safe during pregnancy. It is excreted in breast milk.

## 2021-03-01 NOTE — H&P ADULT - PROBLEM SELECTOR PLAN 4
[Fruit] : fruit [Vegetables] : vegetables [Meat] : meat [Grains] : grains [Eggs] : eggs [Fish] : fish [Dairy] : dairy [Normal] : Normal [Brushing teeth twice/d] : brushing teeth twice per day [Flossing teeth] : flossing teeth [Yes] : Patient goes to dentist yearly [Toothpaste] : Primary Fluoride Source: Toothpaste [Playtime (60 min/d)] : playtime 60 min a day [Appropiate parent-child-sibling interaction] : appropriate parent-child-sibling interaction [Has Friends] : has friends [Adequate social interactions] : adequate social interactions [Adequate behavior] : adequate behavior - Tylenol PRN [Adequate performance] : adequate performance [Adequate attention] : adequate attention [No difficulties with Homework] : no difficulties with homework [No] : No cigarette smoke exposure [Gun in Home] : no gun in home [Appropriately restrained in motor vehicle] : appropriately restrained in motor vehicle [Supervised outdoor play] : supervised outdoor play [Supervised around water] : supervised around water [Wears helmet and pads] : wears helmet and pads [Parent knows child's friends] : parent knows child's friends [Parent discusses safety rules regarding adults] : parent discusses safety rules regarding adults [Monitored computer use] : monitored computer use [Family discusses home emergency plan] : family discusses home emergency plan [Exposure to electronic nicotine delivery system] : No exposure to electronic nicotine delivery system [FreeTextEntry7] : Hx persisent asthma - on flovent 110 bid prescribed by pulmonolgist - well controlled [FreeTextEntry1] : 7 YEARS ANNUAL PHYSICAL

## 2021-05-12 NOTE — H&P ADULT - NSHPLABSRESULTS_GEN_ALL_CORE
14.9   10.9  )-----------( 223      ( 04 Apr 2018 16:17 )             46.8     04-04    132<L>  |  98  |  56<H>  ----------------------------<  120<H>  4.6   |  26  |  1.90<H>    Ca    10.2      04 Apr 2018 16:17    TPro  8.1  /  Alb  3.9  /  TBili  0.7  /  DBili  x   /  AST  32  /  ALT  22  /  AlkPhos  132<H>  04-04    < from: Transthoracic Echocardiogram w/ Doppler (05.04.09 @ 11:45) >    Conclusions:  1. Normal left ventricular function, with no regional wall  motion abnormalities.  2. Normal right ventricular size and systolic function.  A  pacemaker wire is visualized in the right ventricle.  3. A bioprosthetic valve is present in the mitral position  and appears well seated.  Minimal mitral regurgitation.  Mean transmitral valve gradient equals 3mm Hg, which is  probably normal in the setting of a prosthetic valve.  4. Mild-moderate tricuspid regurgitation. Estimated  pulmonary artery systolic pressure equals 42 mm Hg,  assuming right atrial pressure equals 10  mm Hg, consistent  with mild pulmonary hypertension.  *** Compared with echocardiogram of 9/12/2007, no  significant changes noted.    < end of copied text >    < from: CT Abdomen and Pelvis w/ Oral Cont (04.04.18 @ 20:27) >    IMPRESSION:   Thickening versus underdistention extending from distal transverse colon   to sigmoid colon. Correlate for infectious, ischemic, or inflammatory   colitis.      < end of copied text > V-Y Plasty Text: The defect edges were debeveled with a #15 scalpel blade.  Given the location of the defect, shape of the defect and the proximity to free margins an V-Y advancement flap was deemed most appropriate.  Using a sterile surgical marker, an appropriate advancement flap was drawn incorporating the defect and placing the expected incisions within the relaxed skin tension lines where possible.    The area thus outlined was incised deep to adipose tissue with a #15 scalpel blade.  The skin margins were undermined to an appropriate distance in all directions utilizing iris scissors.

## 2022-01-17 NOTE — ED ADULT NURSE NOTE - NSFALLRSKASSESSTYPE_ED_ALL_ED
Problem: Adult Inpatient Plan of Care  Goal: Plan of Care Review  Outcome: Ongoing, Progressing  Goal: Patient-Specific Goal (Individualized)  Outcome: Ongoing, Progressing  Goal: Absence of Hospital-Acquired Illness or Injury  Outcome: Ongoing, Progressing  Goal: Optimal Comfort and Wellbeing  Outcome: Ongoing, Progressing  Goal: Readiness for Transition of Care  Outcome: Ongoing, Progressing     Problem: Impaired Wound Healing  Goal: Optimal Wound Healing  Outcome: Ongoing, Progressing      Initial (On Arrival)

## 2022-05-05 NOTE — OCCUPATIONAL THERAPY INITIAL EVALUATION ADULT - PERSONAL SAFETY AND JUDGMENT, REHAB EVAL
PT DAILY TREATMENT NOTE     Patient Name: Bacilio Chaidez  Date:2022  : 1975  [x]  Patient  Verified  Payor: Agustina Mae / Plan: Ivette Carter / Product Type: HMO /    In time:2:58  Out time:3:59  Total Treatment Time (min): 61  Visit #: 4 of 8    Medicare/BCBS Only   Total Timed Codes (min):  48 1:1 Treatment Time:  48       Treatment Area: Neck pain [M54.2]  Low back pain, unspecified [M54.50]    SUBJECTIVE  Pain Level (0-10 scale): 7  Any medication changes, allergies to medications, adverse drug reactions, diagnosis change, or new procedure performed?: [x] No    [] Yes (see summary sheet for update)  Subjective functional status/changes:   [] No changes reported  The pt presents with headache today left ear and orbital region.  Reports still feeling tight and sore in posterior left shoulder    OBJECTIVE    Modality rationale: decrease inflammation and decrease pain to improve the patients ability to perform ADLs with less post needling soreness   Min Type Additional Details    [] Estim:  []Unatt       []IFC  []Premod                        []Other:  []w/ice   []w/heat  Position:  Location:    [] Estim: []Att    []TENS instruct  []NMES                    []Other:  []w/US   []w/ice   []w/heat  Position:  Location:    []  Traction: [] Cervical       []Lumbar                       [] Prone          []Supine                       []Intermittent   []Continuous Lbs:  [] before manual  [] after manual    []  Ultrasound: []Continuous   [] Pulsed                           []1MHz   []3MHz W/cm2:  Location:    []  Iontophoresis with dexamethasone         Location: [] Take home patch   [] In clinic   10 [x]  Ice     []  heat  []  Ice massage  []  Laser   []  Anodyne Position: right s/l  Location: left shoulder/cervical    []  Laser with stim  []  Other:  Position:  Location:    []  Vasopneumatic Device    []  Right     []  Left  Pre-treatment girth:  Post-treatment girth:  Measured at (location): Pressure:       [] lo [] med [] hi   Temperature: [] lo [] med [] hi   [] Skin assessment post-treatment:  []intact []redness- no adverse reaction    []redness  adverse reaction:         10 min Therapeutic Exercise:  [] See flow sheet :   Rationale: increase ROM and increase strength to improve the patients ability to perform daily tasks     11 min Neuromuscular Re-education:  []  See flow sheet :   Rationale: increase strength, improve coordination and increase proprioception  to improve the patients ability to perform ADLs with improved cervical and scapular mechanics    27 min Manual Therapy:  DN palpation, setup and hemostasis, T/S PAs and rib springs grade III, SOR with STM to left cervical parsapinals   The manual therapy interventions were performed at a separate and distinct time from the therapeutic activities interventions. Rationale: decrease pain, increase ROM and increase tissue extensibility to perform daily tasks with improved ease    3 min Dry Needling:   []  CPT 71885:  needle insertion(s) without injection(s); 1 or 2 muscle(s)  [x]  CPT 50597:  needle insertion(s) without injection(s); 3 or more muscles. Rationale: increase tissue extensibility and decrease trigger points to improve ease of     Dry Needling Procedure Note    Procedure: An intramuscular manual therapy (dry needling) and a neuro-muscular re-education treatment was done to deactivate myofascial trigger points with a 30 gauge filament needle under aseptic technique. Indications:  [x] Myofascial pain and dysfunction [] Muscled spasms  [] Myalgia/myositis   [] Muscle cramps  [x] Muscle imbalances  [] Temporomandibular Dysfunction  [] Other:    Chart reviewed for the following:  Yue MCKNIGHT, have reviewed the medical history, summary list and precautions/contraindications for [employer]   TIME OUT performed immediately prior to start of procedure:  Yue MCKNIGHT, have performed the following reviews on Gaye Mallory prior to the start of the session:      [x] Verified patient identification by name and date of birth    [x] Agreement on all muscles being treated was verified   [x] Purpose of dry needling, side effects, possible complications, risks and benefits were explained to the patient   [x] Procedure site(s) verified  [x] Patient was positioned for comfort and draped for privacy  [x] Informed Consent was signed (initial visit) and verified verbally (subsequent visits)  [x] Patient was instructed on the need to report the use of blood thinners and/or immunosuppressant medications  [x] How to respond to possible adverse effects of treatment  [x] Self treatment of post needling soreness: ice, heat (moist heat, heat wraps) and stretching  [x] Opportunity was given to ask any questions, all questions were answered            Time: 3:07  Date of procedure: 5/5/2022  Treatment: The following muscles were treated today with intramuscular dry needling  [] Left [] Right Masster  [] Left [] Right Temporalis  [] Left [] Right Zygomaticus Major / Minor  [] Left [] Right Lateral Pterygoid  [] Left [] Right Medial Pterygoid  [] Left [] Right Digastric Post / Anterior Belly  [x] Left [] Right Sternocleidomastoid  [] Left [] Right Scalene Anterior / Medial / Posterior  [] Left [] Right Extra Laryngeal Muscles  [x] Left [] Right Upper Trapezius  [] Left [] Right Middle Trapezius  [] Left [] Right Lower Trapezius  [] Left [] Right Oblique Capitis Inferior  [x] Left [] Right Splenius Capitis / Cervicis  [x] Left [] Right Semispinalis: Capitis / Cervicis  [] Left [] Right Multifidi / Rotatores Cervicis / Thoracic  [] Left [] Right Longissimus Thoracis / Illiocostalis  [x] Left [] Right Levator Scapulae  [] Left [] Right Supraspinatus / Infraspinatus  [] Left [] Right Teres Major / Minor  [] Left [] Right Rhomboids / Serratus posterior superior  [] Left [] Right Pectoralis Major / Minor  [] Left [] Right Serratus Anterior  [] Left [] Right Latissimus Dorsi  [] Left [] Right Subscapularis  [] Left [] Right Coracobrachialis  [] Left [] Right Biceps Brachii  [] Left [] Right Deltoid: Anterior / Medial / Posterior  [] Left [] Right Brachialis  [] Left [] Right Triceps  [] Left [] Right Brachioradialis  [] Left [] Right Extensor Carpi Radialis Brevis / Extensor Carpi Radialis Longus    [] Left [] Right  Extensor digitorum  [] Left [] Right Supinator / Pronator Teres  [] Left [] Right Flexor Carpi Radialis/ Flexor Carpi Ulnaris   [] Left [] Right  Flexor Digitorum Superficialis/ Flexor Digitorum Profundus  [] Left [] Right Flexor Pollicis Longus / Flexor Pollicis Brevis / Palmaris Longus  [] Left [] Right Abductor Pollicis Longus / Abductor Pollicis Brevis  [] Left [] Right Opponens Pollicis / Adductor Pollicis  [] Left [] Right Dorsal / Palmar Interossei / Lumbricalis  [] Left [] Right Abductor Digiti Minimi / Opponens Digiti Minimi    Patient's response to today's treatment:  [x] Latent Twitch Response     [] Muscle relaxation [] Pain Relief  [x] Post needling soreness    [x] without complications  [] Increased Range of Motion   [] Decreased headaches    [] Decreased Tinnitus  [] Other:     Performed by: Isra Hernandez DPT CMTPT          With   [] TE   [] TA   [] neuro   [] other: Patient Education: [x] Review HEP    [] Progressed/Changed HEP based on:   [] positioning   [] body mechanics   [] transfers   [] heat/ice application    [] other:      Other Objective/Functional Measures:      Pain Level (0-10 scale) post treatment: 3    ASSESSMENT/Changes in Function: The pt reports abolishment of headache post session with reduced pain. Having trouble attaining carryover however. She does report stress at work which is also seeming to contribute to pain. Provided scapular and RTC strength work for HEP to attempt improved endurance with ADLs to reduce some stress on cervical spine.      Patient will continue to benefit from skilled PT services to modify and progress therapeutic interventions, address functional mobility deficits, address ROM deficits, address strength deficits, analyze and address soft tissue restrictions, analyze and cue movement patterns and analyze and modify body mechanics/ergonomics to attain remaining goals. []  See Plan of Care  []  See progress note/recertification  []  See Discharge Summary         Progress towards goals / Updated goals:  Updated Goals: to be achieved in 4 weeks:  Long Term Goals: To be accomplished in 4 weeks:  1. Pt will demonstrate cervical nod and lift for 20\" without compensation to improve cervical stability with work duties.             PN: progressing - 18 seconds   2. Pt will demonstrate left hip abduction strength to 4+/5 without pain to improve ease of standing activity.               QK: remains - 4/5 hip abduction               YITMAJX: met 4/29/2022 - 4+/5 hip abduction   3. Pt will demonstrate WNL left hip flexion and ER void of pain to improve self care ease.              PN: progressing - flexion WNL with no pain, ER limited with mild lateral hip pain     PLAN  []  Upgrade activities as tolerated     []  Continue plan of care  []  Update interventions per flow sheet       []  Discharge due to:_  []  Other:_      Elda Cherry DPT CMTPT 5/5/2022  3:04 PM    Future Appointments   Date Time Provider Girish Faye   5/9/2022  3:15 PM Aiden Mae [facility]   5/11/2022  3:00 PM [facility]   5/16/2022  3:15 PM Aiden Mae [facility]   5/18/2022  2:30 PM [address] impaired

## 2022-09-13 NOTE — ED PROVIDER NOTE - MUSCULOSKELETAL [+], MLM
1. \"Have you been to the ER, urgent care clinic since your last visit? Hospitalized since your last visit? \" No    2. \"Have you seen or consulted any other health care providers outside of the [address] [place] [address] since your last visit? \" No     3. For patients aged 39-70: Has the patient had a colonoscopy / FIT/ Cologuard? NA - based on age      If the patient is female:    4. For patients aged 41-77: Has the patient had a mammogram within the past 2 years? NA - based on age or sex      11. For patients aged 21-65: Has the patient had a pap smear?  Yes - no Care Gap present BACK PAIN

## 2022-09-15 NOTE — PROGRESS NOTE ADULT - PROBLEM SELECTOR PROBLEM 5
Prophylactic measure Joe Wyatt MD. cth negative. 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 neuro intact. c-collar cleared. pt still w/ snuffbox tenderness. will place pt in thumb spica splint. advise for ortho hand f/u and for repeate xray in 2-3 weeks. ED evaluation and management discussed with the patient. Close PMD follow up encouraged.  Strict ED return instructions discussed in detail and patient given the opportunity to ask any questions about their discharge diagnosis and instructions. Patient verbalized understanding.

## 2023-01-13 NOTE — DISCHARGE NOTE ADULT - NS MD DC PLAN IMMU FLU PROVIDE INFO
[Time Spent: ___ minutes] : I have spent [unfilled] minutes of time on the encounter. Risks/benefits discussed with patient or patient surrogate

## 2023-02-28 NOTE — ED ADULT NURSE REASSESSMENT NOTE - NS ED NURSE REASSESS COMMENT FT1
Break Coverage RN- PT awake and alert. Assisted pt to bedside commode. Pt noted to have BLE edema, and redness on sacrum. Pt breathing equal; unlabored on room air. pt denies chest pain, SOB, NVD, and pain. pt awaiting CT. Safety measures in place. will continue to monitor.
Patient refused

## 2023-03-07 NOTE — ED ADULT TRIAGE NOTE - BANDS:
Allergy; Split-Thickness Skin Graft Text: The defect edges were debeveled with a #15c scalpel blade.  Given the location of the defect, shape of the defect and the proximity to free margins a split thickness skin graft was deemed most appropriate.  Using a sterile surgical marker, the primary defect shape was transferred to the donor site. The split thickness graft was then harvested.  The skin graft was then placed in the primary defect and oriented appropriately.

## 2024-02-16 NOTE — ED PROVIDER NOTE - OBJECTIVE STATEMENT
Assessment: Left knee arthritis    Plan:  Under ultrasound control 40 mg of Kenalog and lidocaine were injected into the left knee.  She had an excellent lidocaine suppression test.  She had done well with cortisone injections in the past especially to the right knee which is feeling quite good right now.  She has changed her diet to a more whole plant-based diet and she feels this helps her.  It actually helped her renal disease quite a bit.  She would benefit from viscosupplementation gel injections and we will try to get approval for these.  She will follow-up with new x-rays in 4 to 6 weeks for reevaluation.   89F PMH HTN, COPD, dementia, CHF, chronic ventral hernias and depression, admission for colitis 4/18 presenting from nursing home with multiple mechanical falls since yesterday. Pt was discharged yesterday with UTI on bactrim. She fell once yesterday and today while in bathroom, slipped and hit her knees. Denies hitting head, LOC, HA, dizziness, CP, SOB, palpitations, abdominal pain, n/v, UE pain or LE pain. 89F PMH HTN, COPD, dementia, CHF with pacemaker on plavix , chronic ventral hernias and depression, admission for colitis 4/18 presenting from nursing home with multiple mechanical falls since yesterday. Pt was discharged yesterday with UTI on bactrim. She fell once yesterday and today while in bathroom, slipped and hit her knees. Denies hitting head, LOC, HA, dizziness, CP, SOB, palpitations, abdominal pain, n/v, UE pain or LE pain.

## 2024-04-08 NOTE — PATIENT PROFILE ADULT. - AS SC BRADEN MOISTURE
How Severe Is Your Skin Lesion?: mild Has Your Skin Lesion Been Treated?: not been treated Is This A New Presentation, Or A Follow-Up?: Skin Lesion (3) occasionally moist

## 2024-04-25 NOTE — ED ADULT TRIAGE NOTE - IDEAL BODY WEIGHT(KG)
55 Completed Therapy?: No Female Completion Statement: After discussing her treatment course we decided to discontinue isotretinoin therapy at this time. I explained that she would need to continue her birth control methods for at least one month after the last dosage. She should also get a pregnancy test one month after the last dose. She shouldn't donate blood for one month after the last dose. She should call with any new symptoms of depression. Myalgia Treatment: I explained this is common when taking isotretinoin. If this worsens they will contact us. They may try OTC ibuprofen. Upper Range (In Mg/Kg): 150 Nosebleeds Normal Treatment: I explained this is common when taking isotretinoin. I recommended saline mist in each nostril multiple times a day. If this worsens they will contact us. Male Completion Statement: After discussing his treatment course we decided to discontinue isotretinoin therapy at this time. He shouldn't donate blood for one month after the last dose. He should call with any new symptoms of depression. Hypertriglyceridemia Monitoring: I explained this is common when taking isotretinoin. If this worsens they will contact us. Are Labs Available For Review?: Yes Cheilitis Normal Treatment: I recommended application of Vaseline or Aquaphor numerous times a day (as often as every hour) and before going to bed. Target Cumulative Dosage (In Mg/Kg): 135 Any Headache: Yes - Monitoring Hypercholesterolemia Monitoring: I explained this is common when taking isotretinoin. We will monitor closely. Retinoid Dermatitis Normal Treatment: I recommended more frequent application of Cetaphil or CeraVe to the areas of dermatitis. Cheilitis Aggressive Treatment: I recommended application of Vaseline or Aquaphor numerous times a day (as often as every hour) and before going to bed. I also prescribed a topical steroid for twice daily use. Xerosis Normal Treatment: I recommended application of Cetaphil or CeraVe numerous times a day and before going to bed to all dry areas. Ipledge Number (Optional): [id number] Retinoid Dermatitis Aggressive Treatment: I recommended more frequent application of Cetaphil or CeraVe to the areas of dermatitis. I also prescribed a topical steroid for twice daily use until the dermatitis resolves. Patient Weight (Optional But Required For Cumulative Dose-Numbers And Decimals Only): 125 Any Nosebleeds?: Yes - Normal Treatment Weight Units: pounds Xerosis Aggressive Treatment: I recommended application of Cetaphil or CeraVe numerous times a day and before going to bed to all dry areas. I also prescribed a topical steroid for twice daily use. Headache Monitoring: I recommended monitoring the headaches for now. There is no evidence of increased intracranial pressure. They were instructed to call if the headaches are worsening. Months Of Therapy Completed: 1 Next Month's Dosage: 30mg BID Comments: Starting month #2 (4/25/24)\\nIncreased dosed to 30 BID, Pt agreed\\nLab ordered today. Dosing Month 1 (Required For Cumulative Dosing): 30mg Daily Xerosis Normal Treatment: I recommended application of Cetaphil or CeraVe numerous times a day going to bed to all dry areas. Counseling Text: I reviewed the side effect in detail. Patient should get monthly blood tests, not donate blood, not drive at night if vision affected, and not share medication. Female Pregnancy Counseling Text: Female patients should also be on two forms of birth control while taking this medication and for one month after their last dose. Detail Level: Zone Xerosis Aggressive Treatment: I recommended application of Cetaphil or CeraVe numerous times a day going to bed to all dry areas. I also prescribed a topical steroid for twice daily use. Use Therapeutic Ranged Or Therapeutic Target: please select Range or Target Pounds Preamble Statement (Weight Entered In Details Tab): Reported Weight in pounds:125 Lower Range (In Mg/Kg): 120 What Is The Patient's Gender: Female Kilograms Preamble Statement (Weight Entered In Details Tab): Reported Weight in kilograms: